# Patient Record
Sex: FEMALE | Race: WHITE | Employment: OTHER | URBAN - METROPOLITAN AREA
[De-identification: names, ages, dates, MRNs, and addresses within clinical notes are randomized per-mention and may not be internally consistent; named-entity substitution may affect disease eponyms.]

---

## 2023-07-12 ENCOUNTER — HOSPITAL ENCOUNTER (OUTPATIENT)
Dept: MRI IMAGING | Age: 83
Discharge: HOME | End: 2023-07-14
Attending: NEUROLOGICAL SURGERY
Payer: MEDICARE

## 2023-07-12 DIAGNOSIS — S32.010A COMPRESSION FRACTURE OF L1 VERTEBRA, INITIAL ENCOUNTER (HCC): ICD-10-CM

## 2023-07-12 PROCEDURE — 72148 MRI LUMBAR SPINE W/O DYE: CPT

## 2023-07-25 ENCOUNTER — HOSPITAL ENCOUNTER (OUTPATIENT)
Dept: CT IMAGING | Age: 83
Discharge: HOME | End: 2023-07-27
Attending: FAMILY MEDICINE
Payer: MEDICARE

## 2023-07-25 DIAGNOSIS — N20.0 KIDNEY STONE: ICD-10-CM

## 2023-07-25 PROCEDURE — 74176 CT ABD & PELVIS W/O CONTRAST: CPT

## 2023-08-10 ENCOUNTER — HOSPITAL ENCOUNTER (OUTPATIENT)
Age: 83
Setting detail: THERAPIES SERIES
Discharge: HOME | End: 2023-08-10
Payer: MEDICARE

## 2023-08-10 PROCEDURE — 97161 PT EVAL LOW COMPLEX 20 MIN: CPT

## 2023-08-10 PROCEDURE — 97110 THERAPEUTIC EXERCISES: CPT

## 2023-08-10 NOTE — CONSULTS
program  Method of Education: [x] Verbal  [x] Demo  [x] Written  Comprehension of Education:  [x] Verbalizes understanding. [] Demonstrates understanding. [] Needs Review. [] Demonstrates/verbalizes understanding of HEP/Ed previously given. Access Code: [email or web address]  URL: [email or web address]  Date: 08/10/2023  Prepared by: Sheryl Moreira    Exercises  - Supine Bridge  - 1 x daily - 7 x weekly - 3 sets - 10 reps  - Supine Posterior Pelvic Tilt  - 1 x daily - 7 x weekly - 3 sets - 10 reps  - Supine Lower Trunk Rotation  - 1 x daily - 7 x weekly - 3 sets - 10 reps  - Supine Straight Leg Raises  - 1 x daily - 7 x weekly - 3 sets - 10 reps  - Sidelying Hip Abduction  - 1 x daily - 7 x weekly - 3 sets - 10 reps  - Prone Hip Extension  - 1 x daily - 7 x weekly - 3 sets - 10 reps  - Lying Prone  - 1 x daily - 7 x weekly - 3 sets - 10 reps  - Prone on Elbows Stretch  - 1 x daily - 7 x weekly - 3 sets - 10 reps  - Supine Hip Adduction Isometric with Ball  - 1 x daily - 7 x weekly - 3 sets - 10 reps    Treatment Plan:  [x] Therapeutic Exercise   81438  [] Iontophoresis: 4 mg/mL Dexamethasone Sodium Phosphate  mAmin  82345   [x] Therapeutic Activity  62300 [] Vasopneumatic cold with compression  53373    [x] Gait Training   64473 [] Ultrasound   [id number]   [x] Neuromuscular Re-education  86365 [x] Electrical Stimulation Unattended  22558   [x] Manual Therapy  84120 [] Electrical Stimulation Attended  93710   [x] Instruction in HEP  [] Lumbar/Cervical Traction  94737   [] Aquatic Therapy   89225 [x] Cold/hotpack    [] Massage   07319      [] Dry Needling, 1 or 2 muscles  68741   [] Biofeedback, first 15 minutes   44410  [] Biofeedback, additional 15 minutes   59191 [] Dry Needling, 3 or more muscles  79086     []  Medication allergies reviewed for use of    Dexamethasone Sodium Phosphate 4mg/ml     with iontophoresis treatments. Pt is not allergic.       Frequency:  2 x/week for 24 visits    Anita Gallegos

## 2023-08-11 ENCOUNTER — HOSPITAL ENCOUNTER (OUTPATIENT)
Dept: CT IMAGING | Age: 83
End: 2023-08-11
Attending: FAMILY MEDICINE
Payer: MEDICARE

## 2023-08-11 DIAGNOSIS — N28.9 KIDNEY LESION, NATIVE, RIGHT: ICD-10-CM

## 2023-08-11 LAB — CREAT BLD-MCNC: 1.7 MG/DL (ref 0.6–1.4)

## 2023-08-11 PROCEDURE — 82565 ASSAY OF CREATININE: CPT

## 2023-08-11 PROCEDURE — 6360000004 HC RX CONTRAST MEDICATION: Performed by: FAMILY MEDICINE

## 2023-08-11 PROCEDURE — 2580000003 HC RX 258: Performed by: FAMILY MEDICINE

## 2023-08-11 RX ORDER — SODIUM CHLORIDE 0.9 % (FLUSH) 0.9 %
10 SYRINGE (ML) INJECTION PRN
Status: DISCONTINUED | OUTPATIENT
Start: 2023-08-11 | End: 2023-08-11

## 2023-08-11 RX ORDER — 0.9 % SODIUM CHLORIDE 0.9 %
80 INTRAVENOUS SOLUTION INTRAVENOUS ONCE
Status: DISCONTINUED | OUTPATIENT
Start: 2023-08-11 | End: 2023-08-11

## 2023-08-15 ENCOUNTER — HOSPITAL ENCOUNTER (OUTPATIENT)
Age: 83
Setting detail: THERAPIES SERIES
Discharge: HOME | End: 2023-08-15
Payer: MEDICARE

## 2023-08-15 PROCEDURE — 97110 THERAPEUTIC EXERCISES: CPT

## 2023-08-15 PROCEDURE — G0283 ELEC STIM OTHER THAN WOUND: HCPCS

## 2023-08-15 NOTE — FLOWSHEET NOTE
[] [address], [address] [place] [address]    Physical Therapy Daily Treatment Note      Date:  8/15/2023  Patient Name:  Baljit Flores    :  1940  MRN: [id number]  Physician: Baldev Colon MD                               Insurance: Prior auth required after evaluation needed for outpt physical therapy with [facility])\  received approval for 8 visits (9 total) from 8/10/23--10/8/23  Medical Diagnosis: R26.81   Gait Instability                          Rehab Codes: R26.81, M54.50, R26.89  Onset Date: 2021                                  Next 's appt: TBD  Visit# / total visits: 2 24  Cancels/No Shows: 0/0    Subjective:    Pain:  [x] Yes  [] No Location: low back Pain Rating: (0-10 scale) 2/10  Pain altered Tx:  [] No  [] Yes  Action:  Comments:  Pt denies new problems. She reports her back pain is minimal now.   Worst when first gets up or after standing for any time    Objective:  Modalities:   Precautions:  Exercises:  Exercise Reps/ Time Weight/ Level Comments   Nu Step  L3  5'                           SUPINE         bridge 10x       LTR 10x       Pelvic tilt 10x       MICHAEL ant hip stretch  30\" x 3       MICHAEL ham stretch  15\" x 4       MICHAEL piriformis stretch  15\" x 4       Hip add isometrics  2\"  x 10       Hooklying hip fall out         SLR                                       PRONE         Prone/LEONARDO 3' each       Prone hip ext 10 each       Sidely hip abd 10 x each                                                         STAND         Heel/toe raises         squats         Step ups         Stand on foam         Stand feet together         Biased stance         Eyes closed                             Side step each way                                                     TENS/moist heat to low back 15'  Prone         Other:Fall Prevention Intervention Form given to pt on 8/15/23    Specific Instructions for next

## 2023-08-18 ENCOUNTER — HOSPITAL ENCOUNTER (OUTPATIENT)
Age: 83
Setting detail: THERAPIES SERIES
Discharge: HOME | End: 2023-08-18
Payer: MEDICARE

## 2023-08-18 PROCEDURE — 97110 THERAPEUTIC EXERCISES: CPT

## 2023-08-18 PROCEDURE — G0283 ELEC STIM OTHER THAN WOUND: HCPCS

## 2023-08-18 NOTE — FLOWSHEET NOTE
[x] Demo  [x] Written  Comprehension of Education:  [x] Verbalizes understanding. [] Demonstrates understanding. [x] Needs review. [] Demonstrates/verbalizes HEP/Ed previously given. Access Code: [email or web address]  URL: [email or web address]  Date: 08/10/2023  Prepared by: Kellen Dixon     Exercises  - Supine Bridge  - 1 x daily - 7 x weekly - 3 sets - 10 reps  - Supine Posterior Pelvic Tilt  - 1 x daily - 7 x weekly - 3 sets - 10 reps  - Supine Lower Trunk Rotation  - 1 x daily - 7 x weekly - 3 sets - 10 reps  - Supine Straight Leg Raises  - 1 x daily - 7 x weekly - 3 sets - 10 reps  - Sidelying Hip Abduction  - 1 x daily - 7 x weekly - 3 sets - 10 reps  - Prone Hip Extension  - 1 x daily - 7 x weekly - 3 sets - 10 reps  - Lying Prone  - 1 x daily - 7 x weekly - 3 sets - 10 reps  - Prone on Elbows Stretch  - 1 x daily - 7 x weekly - 3 sets - 10 reps  - Supine Hip Adduction Isometric with Ball  - 1 x daily - 7 x weekly - 3 sets - 10 reps    - Supine Hamstring Stretch  - 1 x daily - 7 x weekly - 3 sets - 10 reps  - Supine Piriformis Stretch with Leg Straight  - 1 x daily - 7 x weekly - 3 sets - 10 reps       Plan: [x] Continue per plan of care.    [] Other:      Treatment Charges: Mins Units   [x]  Modalities E STIM with heat 15 1   [x]  Ther Exercise 40 3   []  Manual Therapy     []  Ther Activities     []  Aquatics     []  Neuromuscular     [] Vasocompression     [] Gait Training     [] Dry needling        [] 1 or 2 muscles        [] 3 or more muscles     []  Other     Total Treatment time 55 4     Time In:2:30 pm            Time Out: 3:40 pm    Electronically signed by:  Kellen Dixon PT

## 2023-08-21 ENCOUNTER — TRANSCRIBE ORDERS (OUTPATIENT)
Dept: ADMINISTRATIVE | Age: 83
End: 2023-08-21

## 2023-08-21 DIAGNOSIS — N28.9 LESION OF RIGHT NATIVE KIDNEY: Primary | ICD-10-CM

## 2023-08-22 ENCOUNTER — HOSPITAL ENCOUNTER (OUTPATIENT)
Age: 83
Setting detail: THERAPIES SERIES
Discharge: HOME | End: 2023-08-22
Payer: MEDICARE

## 2023-08-22 PROCEDURE — 97110 THERAPEUTIC EXERCISES: CPT

## 2023-08-22 PROCEDURE — G0283 ELEC STIM OTHER THAN WOUND: HCPCS

## 2023-08-22 NOTE — FLOWSHEET NOTE
[x] [facility]) - [facility] & Therapy  [address] [facility], [address] [place], [place] [address]     Physical Therapy Daily Treatment Note      Date:  2023  Patient Name:  Erin Gallardo    :  1940  MRN: [id number]  Physician: Mirella Vincent MD                               Insurance: Prior auth required after evaluation needed for outpt physical therapy with [facility])\  received approval for 8 visits (9 total) from 8/10/23--10/8/23  Medical Diagnosis: R26.81   Gait Instability                          Rehab Codes: R26.81, M54.50, R26.89  Onset Date: 2021                                  Next 's appt: TBD  Visit# / total visits:   Cancels/No Shows: 0/0    Subjective:    Pain:  [x] Yes  [] No Location: low back Pain Rating: (0-10 scale) 3/10  Pain altered Tx:  [x] No  [] Yes  Action:  Comments:  Pt denies new problems. 3/10 pain today. State she had a dentist appointment and was a little sore after laying in the chair for a while.      Objective:  Modalities:   Precautions:  Exercises:  Exercise Reps/ Time Weight/ Level Comments   Nu Step  L3  5'                           SUPINE         bridge 10x       LTR 10x       Pelvic tilt 10x       MICHAEL ant hip stretch  30\" x 3       MICHAEL ham stretch  15\" x 4       MICHAEL piriformis stretch  15\" x 4       Hip add isometrics  2\"  x 10       Hooklying hip fall out  10x       SLR  10x                                      PRONE         Prone/LEONARDO 3' each       Prone hip ext 10 each       Sidelying hip abd 10 x each        prone press ups 5x                                             STAND         Heel/toe raises  10x    At bed    squats         Step ups         Stand on foam         Stand feet together  1' x 2    At bed SBA   Biased stance         Eyes closed                             Side step each way                                                     TENS/moist heat to low back 15'  Prone         Other:Fall Prevention

## 2023-08-24 ENCOUNTER — HOSPITAL ENCOUNTER (OUTPATIENT)
Dept: CT IMAGING | Age: 83
Discharge: HOME | End: 2023-08-26
Attending: FAMILY MEDICINE
Payer: MEDICARE

## 2023-08-24 DIAGNOSIS — N28.9 LESION OF RIGHT NATIVE KIDNEY: ICD-10-CM

## 2023-08-24 LAB — CREAT BLD-MCNC: 1.6 MG/DL (ref 0.6–1.4)

## 2023-08-24 PROCEDURE — 74178 CT ABD&PLV WO CNTR FLWD CNTR: CPT

## 2023-08-24 PROCEDURE — 2580000003 HC RX 258: Performed by: FAMILY MEDICINE

## 2023-08-24 PROCEDURE — 6360000004 HC RX CONTRAST MEDICATION: Performed by: FAMILY MEDICINE

## 2023-08-24 PROCEDURE — 82565 ASSAY OF CREATININE: CPT

## 2023-08-24 RX ORDER — SODIUM CHLORIDE 0.9 % (FLUSH) 0.9 %
10 SYRINGE (ML) INJECTION PRN
Status: DISCONTINUED | OUTPATIENT
Start: 2023-08-24 | End: 2023-08-27 | Stop reason: HOSPADM

## 2023-08-24 RX ORDER — 0.9 % SODIUM CHLORIDE 0.9 %
80 INTRAVENOUS SOLUTION INTRAVENOUS ONCE
Status: COMPLETED | OUTPATIENT
Start: 2023-08-24 | End: 2023-08-24

## 2023-08-24 RX ADMIN — IOPAMIDOL 37 ML: 755 INJECTION, SOLUTION INTRAVENOUS at 14:50

## 2023-08-24 RX ADMIN — SODIUM CHLORIDE, PRESERVATIVE FREE 10 ML: 5 INJECTION INTRAVENOUS at 14:51

## 2023-08-24 RX ADMIN — SODIUM CHLORIDE 80 ML: 9 INJECTION, SOLUTION INTRAVENOUS at 14:51

## 2023-08-25 ENCOUNTER — HOSPITAL ENCOUNTER (OUTPATIENT)
Age: 83
Setting detail: THERAPIES SERIES
Discharge: HOME | End: 2023-08-25
Payer: MEDICARE

## 2023-08-25 PROCEDURE — G0283 ELEC STIM OTHER THAN WOUND: HCPCS

## 2023-08-25 PROCEDURE — 97110 THERAPEUTIC EXERCISES: CPT

## 2023-08-25 NOTE — FLOWSHEET NOTE
[x] [facility]) - [facility] & Therapy  [address] [facility], [address] [place] [address]    Physical Therapy Daily Treatment Note      Date:  2023  Patient Name:  Carolyne Dennison    :  1940  MRN: [id number]  Physician: Jim Noble MD                               Insurance: Prior auth required after evaluation needed for outpt physical therapy with [facility])\  received approval for 8 visits (9 total) from 8/10/23--10/8/23  Medical Diagnosis: R26.81   Gait Instability                          Rehab Codes: R26.81, M54.50, R26.89  Onset Date: 2021                                  Next 's appt: TBD  Visit# / total visits:   Cancels/No Shows: 0/0    Subjective:    Pain:  [x] Yes  [] No Location: low back Pain Rating: (0-10 scale) 3/10  Pain altered Tx:  [x] No  [] Yes  Action:  Comments:  Pt denies new problems. 4/10 pain today. States she did fall backward yesterday tripping over something at home. Pt denies any further medical attention needed. States she is a little more sore today.      Objective:  Modalities:   Precautions:  Exercises:  Exercise Reps/ Time Weight/ Level Comments   Nu Step  L3  5'    NP                        SUPINE         bridge 10x       LTR 10x       Pelvic tilt 10x       MICHAEL ant hip stretch  30\" x 3       MICHAEL ham stretch  15\" x 4       MICHAEL piriformis stretch  15\" x 4       Hip add isometrics  2\"  x 10       Hooklying hip fall out  10x       SLR  10x                                      PRONE         Prone/LEONARDO 3' each       Prone hip ext 10 each       Sidelying hip abd 10 x each        prone press ups 5x                                             STAND         Heel/toe raises  10x    At bed    squats         Step ups         Stand on foam         Stand feet together  1' x 2    At bed SBA   Biased stance         Eyes closed                             Side step each way

## 2023-08-29 ENCOUNTER — HOSPITAL ENCOUNTER (OUTPATIENT)
Age: 83
Setting detail: THERAPIES SERIES
Discharge: HOME | End: 2023-08-29
Payer: MEDICARE

## 2023-08-29 PROCEDURE — 97110 THERAPEUTIC EXERCISES: CPT

## 2023-08-29 PROCEDURE — G0283 ELEC STIM OTHER THAN WOUND: HCPCS

## 2023-08-29 NOTE — FLOWSHEET NOTE
[x] [facility]) - [facility] & Therapy  [address] [facility], [address] [place] [address]    Physical Therapy Daily Treatment Note      Date:  2023  Patient Name:  Kerri Gama    :  1940  MRN: [id number]  Physician: Ben Reese MD                               Insurance: Prior auth required after evaluation needed for outpt physical therapy with [facility])\  received approval for 8 visits (9 total) from 8/10/23--10/8/23  Medical Diagnosis: R26.81   Gait Instability                          Rehab Codes: R26.81, M54.50, R26.89  Onset Date: 2021                                  Next 's appt: TBD  Visit# / total visits:   Cancels/No Shows: 0/0    Subjective:    Pain:  [x] Yes  [] No Location: low back Pain Rating: (0-10 scale) 3/10  Pain altered Tx:  [x] No  [] Yes  Action:  Comments:  Pt denies new problems. 3/10 pain today. Denies new problems. Denies falls since last PT session. Pt reports she still has trouble walking on uneven surfaces but has been able to walk through grocery store. She reports minimal increase of pain with this but she gets very fatigued before she is done.     Objective:  Modalities:   Precautions:  Exercises:  Exercise Reps/ Time Weight/ Level Comments   Nu Step  L3  5'                           SUPINE         bridge 10x       LTR 10x       Pelvic tilt 10x       MICHAEL ant hip stretch  30\" x 3       MICHAEL ham stretch  15\" x 4       MICHAEL piriformis stretch  15\" x 4       Hip add isometrics  2\"  x 20       Hooklying hip fall out  10x       SLR  10x                                      PRONE         Prone/LEONARDO 3' each       Prone hip ext 10 each       Sidelying hip abd 10 x each        prone press ups 5x                                             STAND         Heel/toe raises  15x    At bed    squats  10x       Step ups         Stand on foam         Stand feet together  1' x 2    At bed SBA   Biased stance  2x each       Eyes closed

## 2023-08-30 PROBLEM — M54.16 LUMBAR RADICULOPATHY: Status: ACTIVE | Noted: 2023-08-30

## 2023-08-30 PROBLEM — G62.9 SMALL FIBER NEUROPATHY: Status: ACTIVE | Noted: 2018-11-29

## 2023-08-30 PROBLEM — E66.9 OBESITY (BMI 30-39.9): Status: ACTIVE | Noted: 2023-08-30

## 2023-08-30 PROBLEM — R30.0 DYSURIA: Status: ACTIVE | Noted: 2023-08-30

## 2023-08-30 PROBLEM — K14.1 GEOGRAPHIC TONGUE: Status: ACTIVE | Noted: 2023-02-15

## 2023-08-30 PROBLEM — T78.3XXA ANGIOEDEMA: Status: ACTIVE | Noted: 2023-08-30

## 2023-08-30 PROBLEM — R29.6 FALLS: Status: ACTIVE | Noted: 2023-08-30

## 2023-08-30 PROBLEM — G50.0 TRIGEMINAL NEURALGIA: Status: ACTIVE | Noted: 2023-08-30

## 2023-08-30 PROBLEM — E11.9 T2DM (TYPE 2 DIABETES MELLITUS) (HCC): Status: ACTIVE | Noted: 2023-02-15

## 2023-08-30 PROBLEM — M10.9 GOUT: Status: ACTIVE | Noted: 2023-02-15

## 2023-08-30 PROBLEM — R47.81 SLURRED SPEECH: Status: ACTIVE | Noted: 2023-08-30

## 2023-08-30 PROBLEM — K21.9 GERD (GASTROESOPHAGEAL REFLUX DISEASE): Status: ACTIVE | Noted: 2023-02-15

## 2023-08-30 PROBLEM — I10 HYPERTENSION: Status: ACTIVE | Noted: 2023-08-30

## 2023-08-30 PROBLEM — E03.9 HYPOTHYROIDISM: Status: ACTIVE | Noted: 2023-08-30

## 2023-08-30 PROBLEM — D47.2 MONOCLONAL GAMMOPATHY OF UNKNOWN SIGNIFICANCE (MGUS): Status: ACTIVE | Noted: 2023-02-15

## 2023-08-30 PROBLEM — M48.00 SPINAL STENOSIS: Status: ACTIVE | Noted: 2023-02-15

## 2023-08-30 PROBLEM — E78.5 HYPERLIPIDEMIA: Status: ACTIVE | Noted: 2023-02-15

## 2023-08-30 PROBLEM — G47.33 OBSTRUCTIVE SLEEP APNEA SYNDROME: Status: ACTIVE | Noted: 2023-02-15

## 2023-08-30 PROBLEM — F41.9 ANXIETY: Status: ACTIVE | Noted: 2023-08-30

## 2023-08-30 PROBLEM — M17.9 OSTEOARTHRITIS OF KNEE: Status: ACTIVE | Noted: 2023-08-30

## 2023-08-30 PROBLEM — G62.9 NEUROPATHY: Status: ACTIVE | Noted: 2023-08-30

## 2023-08-30 PROBLEM — G45.9 TRANSIENT ISCHEMIC ATTACK: Status: ACTIVE | Noted: 2023-08-30

## 2023-08-30 PROBLEM — F33.40 RECURRENT MAJOR DEPRESSION IN REMISSION (HCC): Status: ACTIVE | Noted: 2023-08-30
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
## 2023-08-30 RX ORDER — EPINEPHRINE 0.3 MG/.3ML
0.3 INJECTION SUBCUTANEOUS
COMMUNITY

## 2023-08-30 RX ORDER — LORAZEPAM 1 MG/1
TABLET ORAL
COMMUNITY
Start: 2023-06-08

## 2023-08-30 RX ORDER — AMITRIPTYLINE HYDROCHLORIDE 25 MG/1
25 TABLET, FILM COATED ORAL NIGHTLY
COMMUNITY
Start: 2019-07-12

## 2023-08-30 RX ORDER — AMLODIPINE BESYLATE 10 MG/1
10 TABLET ORAL
COMMUNITY

## 2023-08-30 RX ORDER — FAMOTIDINE 20 MG/1
20 TABLET, FILM COATED ORAL
COMMUNITY
Start: 2021-09-24

## 2023-08-30 RX ORDER — ASPIRIN 81 MG/1
81 TABLET ORAL DAILY
COMMUNITY
Start: 2022-05-19

## 2023-08-30 RX ORDER — BUPROPION HYDROCHLORIDE 300 MG/1
TABLET ORAL
COMMUNITY
Start: 2023-07-19

## 2023-08-30 RX ORDER — CITALOPRAM 40 MG/1
TABLET ORAL
COMMUNITY
Start: 2023-07-13

## 2023-08-30 RX ORDER — MELOXICAM 15 MG/1
TABLET ORAL
COMMUNITY
Start: 2023-07-20

## 2023-08-30 RX ORDER — FEXOFENADINE HCL 180 MG/1
180 TABLET ORAL
COMMUNITY

## 2023-08-30 RX ORDER — METOPROLOL TARTRATE 50 MG/1
50 TABLET, FILM COATED ORAL
COMMUNITY
Start: 2021-05-06

## 2023-08-30 RX ORDER — OXYBUTYNIN CHLORIDE 5 MG/1
5 TABLET ORAL 3 TIMES DAILY
COMMUNITY

## 2023-08-30 RX ORDER — RANITIDINE 150 MG/1
150 CAPSULE ORAL 2 TIMES DAILY
COMMUNITY

## 2023-08-30 RX ORDER — ALLOPURINOL 100 MG/1
100 TABLET ORAL 2 TIMES DAILY
COMMUNITY
Start: 2021-07-01

## 2023-08-30 RX ORDER — ISOSORBIDE MONONITRATE 30 MG/1
TABLET, EXTENDED RELEASE ORAL
COMMUNITY
Start: 2023-07-15

## 2023-08-30 RX ORDER — FENTANYL 25 UG/1
PATCH TRANSDERMAL
COMMUNITY
Start: 2021-06-29

## 2023-08-30 RX ORDER — LEVOTHYROXINE SODIUM 0.1 MG/1
100 TABLET ORAL DAILY
COMMUNITY
Start: 2020-11-19

## 2023-08-30 RX ORDER — IBUPROFEN 600 MG/1
600 TABLET ORAL
COMMUNITY
Start: 2021-09-24 | End: 2023-09-01

## 2023-08-30 RX ORDER — OXYCODONE HYDROCHLORIDE AND ACETAMINOPHEN 5; 325 MG/1; MG/1
TABLET ORAL
COMMUNITY

## 2023-08-30 RX ORDER — OMEPRAZOLE 20 MG/1
20 CAPSULE, DELAYED RELEASE ORAL
COMMUNITY
Start: 2020-11-23

## 2023-08-30 RX ORDER — NIFEDIPINE 30 MG/1
TABLET, FILM COATED, EXTENDED RELEASE ORAL
COMMUNITY
Start: 2023-06-12

## 2023-08-30 RX ORDER — CARVEDILOL 12.5 MG/1
TABLET ORAL
COMMUNITY
Start: 2023-07-05

## 2023-08-30 RX ORDER — LORATADINE 10 MG/1
10 TABLET ORAL 2 TIMES DAILY
COMMUNITY
End: 2023-09-01

## 2023-09-01 ENCOUNTER — HOSPITAL ENCOUNTER (OUTPATIENT)
Age: 83
Setting detail: THERAPIES SERIES
Discharge: HOME | End: 2023-09-01
Payer: MEDICARE

## 2023-09-01 ENCOUNTER — OFFICE VISIT (OUTPATIENT)
Age: 83
End: 2023-09-01
Payer: MEDICARE

## 2023-09-01 VITALS — DIASTOLIC BLOOD PRESSURE: 80 MMHG | BODY MASS INDEX: 35.61 KG/M2 | SYSTOLIC BLOOD PRESSURE: 128 MMHG | WEIGHT: 201 LBS

## 2023-09-01 DIAGNOSIS — J20.9 BRONCHOSPASM WITH BRONCHITIS, ACUTE: Primary | ICD-10-CM

## 2023-09-01 DIAGNOSIS — Z91.81 AT HIGH RISK FOR FALLS: ICD-10-CM

## 2023-09-01 PROCEDURE — 97110 THERAPEUTIC EXERCISES: CPT

## 2023-09-01 PROCEDURE — 3078F DIAST BP <80 MM HG: CPT | Performed by: FAMILY MEDICINE

## 2023-09-01 PROCEDURE — 3075F SYST BP GE 130 - 139MM HG: CPT | Performed by: FAMILY MEDICINE

## 2023-09-01 PROCEDURE — 99213 OFFICE O/P EST LOW 20 MIN: CPT | Performed by: FAMILY MEDICINE

## 2023-09-01 PROCEDURE — 96372 THER/PROPH/DIAG INJ SC/IM: CPT | Performed by: FAMILY MEDICINE

## 2023-09-01 PROCEDURE — 1123F ACP DISCUSS/DSCN MKR DOCD: CPT | Performed by: FAMILY MEDICINE

## 2023-09-01 PROCEDURE — G0283 ELEC STIM OTHER THAN WOUND: HCPCS

## 2023-09-01 RX ORDER — METHYLPREDNISOLONE ACETATE 80 MG/ML
80 INJECTION, SUSPENSION INTRA-ARTICULAR; INTRALESIONAL; INTRAMUSCULAR; SOFT TISSUE ONCE
Status: COMPLETED | OUTPATIENT
Start: 2023-09-01 | End: 2023-09-01

## 2023-09-01 RX ORDER — LANOLIN ALCOHOL/MO/W.PET/CERES
1000 CREAM (GRAM) TOPICAL DAILY
COMMUNITY

## 2023-09-01 RX ADMIN — METHYLPREDNISOLONE ACETATE 80 MG: 80 INJECTION, SUSPENSION INTRA-ARTICULAR; INTRALESIONAL; INTRAMUSCULAR; SOFT TISSUE at 15:19

## 2023-09-01 SDOH — ECONOMIC STABILITY: FOOD INSECURITY: WITHIN THE PAST 12 MONTHS, YOU WORRIED THAT YOUR FOOD WOULD RUN OUT BEFORE YOU GOT MONEY TO BUY MORE.: NEVER TRUE

## 2023-09-01 SDOH — ECONOMIC STABILITY: FOOD INSECURITY: WITHIN THE PAST 12 MONTHS, THE FOOD YOU BOUGHT JUST DIDN'T LAST AND YOU DIDN'T HAVE MONEY TO GET MORE.: NEVER TRUE

## 2023-09-01 SDOH — ECONOMIC STABILITY: HOUSING INSECURITY
IN THE LAST 12 MONTHS, WAS THERE A TIME WHEN YOU DID NOT HAVE A STEADY PLACE TO SLEEP OR SLEPT IN A SHELTER (INCLUDING NOW)?: NO

## 2023-09-01 SDOH — ECONOMIC STABILITY: INCOME INSECURITY: HOW HARD IS IT FOR YOU TO PAY FOR THE VERY BASICS LIKE FOOD, HOUSING, MEDICAL CARE, AND HEATING?: NOT HARD AT ALL

## 2023-09-01 NOTE — FLOWSHEET NOTE
[x] [facility]) - [facility] & Therapy  [address] [facility], [address] [place] [address]    Physical Therapy Daily Treatment Note      Date:  2023  Patient Name:  Maira Naranjo    :  1940  MRN: [id number]  Physician: Tigre Wilson MD                               Insurance: Prior auth required after evaluation needed for outpt physical therapy with [facility])\  received approval for 8 visits (9 total) from 8/10/23--10/8/23  Medical Diagnosis: R26.81   Gait Instability                          Rehab Codes: R26.81, M54.50, R26.89  Onset Date: 2021                                  Next 's appt: TBD  Visit# / total visits:   Cancels/No Shows: 0/0    Subjective:    Pain:  [x] Yes  [] No Location: low back Pain Rating: (0-10 scale) 3/10  Pain altered Tx:  [x] No  [] Yes  Action:  Comments:  Pt denies new problems. Pt reports she took a shower earlier today and when she was finished she had increased pain in her back,  up to 5/10 today.   She also notes she gets increased pain with prolonged standing in the kitchen  Objective:  Modalities:   Precautions:  Exercises:  Exercise Reps/ Time Weight/ Level Comments   Nu Step  L3  5'                           SUPINE         bridge 10x       LTR 10x       Pelvic tilt 10x       MICHAEL ant hip stretch  30\" x 3       MICHAEL ham stretch  15\" x 4       MICHAEL piriformis stretch  15\" x 4       Hip add isometrics  2\"  x 20       Hooklying hip fall out  15x       SLR  10x         Hooklying hip ER  red x 15                           PRONE         Prone/LEONARDO 3' each       Prone hip ext 10 each       Sidelying hip abd 10 x each        prone press ups 5x                                             STAND         Heel/toe raises  15x    At bed    squats  10x       Step ups         Stand on foam         Stand feet together  1' x 2    At bed SBA   Biased stance  2x each       Eyes closed  2x         MICHAEL 3 way hip  10 each                 Side

## 2023-09-01 NOTE — PROGRESS NOTES
[facility]      Date of Visit:  2023  Patient Name: Jn Mosley   Patient :  1940     CHIEF COMPLAINT/HPI:     Jn Mosley is a 82 y.o. female who presents today for an general visit to be evaluated for the following condition(s):  Chief Complaint   Patient presents with    Results     Recent CT test   Patient having problems with recurring barking cough. Her son in law is having problems going through chemo. Her bladder spasms have reduced. Her urine stream is also hesitency. Patient has had 3 falls since last visit. Patient is going to PT for her back. Her DTR    REVIEW OF SYSTEM      Review of Systems   Respiratory:  Negative for chest tightness and shortness of breath. Cardiovascular:  Negative for chest pain.        REVIEWED INFORMATION      Allergies   Allergen Reactions    Cephalexin Other (See Comments)     Depression      Flavoxate Hcl     Macadamia Nut Oil      Other reaction(s): skin test  Other reaction(s): skin test      Sulfa Antibiotics     Sulphamethoxydiazine Hives    Tizanidine     Penicillins Swelling and Rash       Current Outpatient Medications   Medication Sig Dispense Refill    vitamin B-12 (CYANOCOBALAMIN) 1000 MCG tablet Take 1 tablet by mouth daily      vitamin D 25 MCG (1000 UT) CAPS Take 2 capsules by mouth      aspirin 81 MG EC tablet Take 1 tablet by mouth daily      buPROPion (WELLBUTRIN XL) 300 MG extended release tablet 1 tablet in the morning Orally Once a day for 30 days      carvedilol (COREG) 12.5 MG tablet       citalopram (CELEXA) 40 MG tablet       EPINEPHrine (EPIPEN) 0.3 MG/0.3ML SOAJ injection Inject 0.3 mLs into the muscle once as needed      famotidine (PEPCID) 20 MG tablet Take 1 tablet by mouth      fexofenadine (ALLEGRA) 180 MG tablet Take 1 tablet by mouth      isosorbide mononitrate (IMDUR) 30 MG extended release tablet       levothyroxine (SYNTHROID) 100 MCG tablet Take 1 tablet by mouth daily      LORazepam (ATIVAN) 1 MG tablet

## 2023-09-04 ENCOUNTER — APPOINTMENT (OUTPATIENT)
Dept: GENERAL RADIOLOGY | Age: 83
End: 2023-09-04
Payer: MEDICARE

## 2023-09-04 ENCOUNTER — HOSPITAL ENCOUNTER (EMERGENCY)
Age: 83
Discharge: HOME | End: 2023-09-04
Attending: EMERGENCY MEDICINE
Payer: MEDICARE

## 2023-09-04 VITALS
BODY MASS INDEX: 35.61 KG/M2 | TEMPERATURE: 99.7 F | RESPIRATION RATE: 16 BRPM | OXYGEN SATURATION: 95 % | SYSTOLIC BLOOD PRESSURE: 137 MMHG | DIASTOLIC BLOOD PRESSURE: 77 MMHG | HEIGHT: 63 IN | WEIGHT: 201 LBS | HEART RATE: 81 BPM

## 2023-09-04 DIAGNOSIS — S62.101A CLOSED FRACTURE OF RIGHT WRIST, INITIAL ENCOUNTER: Primary | ICD-10-CM

## 2023-09-04 PROCEDURE — 25605 CLTX DST RDL FX/EPHYS SEP W/: CPT

## 2023-09-04 PROCEDURE — 2500000003 HC RX 250 WO HCPCS

## 2023-09-04 PROCEDURE — 6370000000 HC RX 637 (ALT 250 FOR IP): Performed by: EMERGENCY MEDICINE

## 2023-09-04 PROCEDURE — 73110 X-RAY EXAM OF WRIST: CPT

## 2023-09-04 PROCEDURE — 99283 EMERGENCY DEPT VISIT LOW MDM: CPT

## 2023-09-04 RX ORDER — LIDOCAINE HYDROCHLORIDE 10 MG/ML
10 INJECTION, SOLUTION EPIDURAL; INFILTRATION; INTRACAUDAL; PERINEURAL ONCE
Status: COMPLETED | OUTPATIENT
Start: 2023-09-04 | End: 2023-09-04

## 2023-09-04 RX ORDER — LIDOCAINE HYDROCHLORIDE 10 MG/ML
20 INJECTION, SOLUTION INFILTRATION; PERINEURAL ONCE
Status: DISCONTINUED | OUTPATIENT
Start: 2023-09-04 | End: 2023-09-04

## 2023-09-04 RX ORDER — LIDOCAINE HYDROCHLORIDE 10 MG/ML
INJECTION, SOLUTION EPIDURAL; INFILTRATION; INTRACAUDAL; PERINEURAL
Status: COMPLETED
Start: 2023-09-04 | End: 2023-09-04

## 2023-09-04 RX ORDER — OXYCODONE HYDROCHLORIDE AND ACETAMINOPHEN 5; 325 MG/1; MG/1
1 TABLET ORAL ONCE
Status: COMPLETED | OUTPATIENT
Start: 2023-09-04 | End: 2023-09-04

## 2023-09-04 RX ORDER — HYDROCODONE BITARTRATE AND ACETAMINOPHEN 5; 325 MG/1; MG/1
1 TABLET ORAL ONCE
Status: DISCONTINUED | OUTPATIENT
Start: 2023-09-04 | End: 2023-09-04

## 2023-09-04 RX ADMIN — LIDOCAINE HYDROCHLORIDE 10 ML: 10 INJECTION, SOLUTION EPIDURAL; INFILTRATION; INTRACAUDAL; PERINEURAL at 10:34

## 2023-09-04 RX ADMIN — OXYCODONE HYDROCHLORIDE AND ACETAMINOPHEN 1 TABLET: 5; 325 TABLET ORAL at 09:40

## 2023-09-04 ASSESSMENT — PAIN SCALES - GENERAL
PAINLEVEL_OUTOF10: 2
PAINLEVEL_OUTOF10: 2

## 2023-09-04 ASSESSMENT — ENCOUNTER SYMPTOMS
CHEST TIGHTNESS: 0
SHORTNESS OF BREATH: 0

## 2023-09-04 ASSESSMENT — PAIN - FUNCTIONAL ASSESSMENT: PAIN_FUNCTIONAL_ASSESSMENT: 0-10

## 2023-09-04 NOTE — DISCHARGE INSTRUCTIONS
Continue medications as prescribed  Maintain splint until follow-up  With orthopedist call for an appointment on Tuesday  Return immediately if any worsening symptoms or any other concerns    Tell us how we did visit: [email or web address]   and let us know about your experience

## 2023-09-05 ENCOUNTER — OFFICE VISIT (OUTPATIENT)
Age: 83
End: 2023-09-05
Payer: MEDICARE

## 2023-09-05 ENCOUNTER — HOSPITAL ENCOUNTER (OUTPATIENT)
Age: 83
Discharge: HOME | End: 2023-09-05
Payer: MEDICARE

## 2023-09-05 ENCOUNTER — TELEPHONE (OUTPATIENT)
Age: 83
End: 2023-09-05

## 2023-09-05 VITALS — WEIGHT: 208 LBS | HEIGHT: 62 IN | BODY MASS INDEX: 38.28 KG/M2

## 2023-09-05 DIAGNOSIS — S52.571A OTHER CLOSED INTRA-ARTICULAR FRACTURE OF DISTAL END OF RIGHT RADIUS, INITIAL ENCOUNTER: Primary | ICD-10-CM

## 2023-09-05 DIAGNOSIS — Z01.818 PRE-OP EXAM: ICD-10-CM

## 2023-09-05 DIAGNOSIS — Z01.818 PRE-OP EXAM: Primary | ICD-10-CM

## 2023-09-05 LAB
BASOPHILS # BLD: 0.04 K/UL (ref 0–0.2)
BASOPHILS NFR BLD: 0 % (ref 0–2)
EOSINOPHIL # BLD: 0.37 K/UL (ref 0–0.44)
EOSINOPHILS RELATIVE PERCENT: 4 % (ref 1–4)
ERYTHROCYTE [DISTWIDTH] IN BLOOD BY AUTOMATED COUNT: 15.5 % (ref 11.8–14.4)
HCT VFR BLD AUTO: 37.2 % (ref 36.3–47.1)
HGB BLD-MCNC: 11.4 G/DL (ref 11.9–15.1)
IMM GRANULOCYTES # BLD AUTO: 0.05 K/UL (ref 0–0.3)
IMM GRANULOCYTES NFR BLD: 1 %
LYMPHOCYTES NFR BLD: 1.57 K/UL (ref 1.1–3.7)
LYMPHOCYTES RELATIVE PERCENT: 17 % (ref 24–43)
MCH RBC QN AUTO: 29.2 PG (ref 25.2–33.5)
MCHC RBC AUTO-ENTMCNC: 30.6 G/DL (ref 28.4–34.8)
MCV RBC AUTO: 95.4 FL (ref 82.6–102.9)
MONOCYTES NFR BLD: 0.85 K/UL (ref 0.1–1.2)
MONOCYTES NFR BLD: 9 % (ref 3–12)
NEUTROPHILS NFR BLD: 69 % (ref 36–65)
NEUTS SEG NFR BLD: 6.6 K/UL (ref 1.5–8.1)
NRBC BLD-RTO: 0 PER 100 WBC
PLATELET # BLD AUTO: 217 K/UL (ref 138–453)
PMV BLD AUTO: 10.6 FL (ref 8.1–13.5)
RBC # BLD AUTO: 3.9 M/UL (ref 3.95–5.11)
RBC # BLD: ABNORMAL 10*6/UL
WBC OTHER # BLD: 9.5 K/UL (ref 3.5–11.3)

## 2023-09-05 PROCEDURE — 93005 ELECTROCARDIOGRAM TRACING: CPT | Performed by: ORTHOPAEDIC SURGERY

## 2023-09-05 PROCEDURE — 1123F ACP DISCUSS/DSCN MKR DOCD: CPT | Performed by: ORTHOPAEDIC SURGERY

## 2023-09-05 PROCEDURE — 99214 OFFICE O/P EST MOD 30 MIN: CPT | Performed by: ORTHOPAEDIC SURGERY

## 2023-09-05 PROCEDURE — 36415 COLL VENOUS BLD VENIPUNCTURE: CPT

## 2023-09-05 PROCEDURE — 85025 COMPLETE CBC W/AUTO DIFF WBC: CPT

## 2023-09-05 NOTE — PROGRESS NOTES
[place] [address] AND SPORTS MEDICINE  [address] [place] [address] [facility] [address] [place] [address] [place] [address]  Dept: [phone]  Dept Fax: [phone]    Chief Compliant:  No chief complaint on file. History of Present Illness: This is a 82 y.o. female who presents to the clinic today for evaluation / follow up of right wrist fracture. She fell yesterday at home and was taken emergency room and was splinted. She denies any numbness or tingling however she does have significant neuropathy in her feet. She does take 4 Percocet a day for chronic back pain and neuropathy pain. Jennifer De Oliveira Physical Exam:    On examination the right wrist she has intact EPL she is in a splint we did not remove this today. Cap of less than 2 seconds of the digits and sensation intact light touch. Nursing note and vitals reviewed. Labs and Imaging: X-rays of the right wrist with injury films and postreduction films show that she has an intra-articular split in the distal radius with dorsal tilt and loss of inclination. She has a ulnar styloid fracture. XR taken today:  XR WRIST RIGHT (MIN 3 VIEWS)    Result Date: 9/4/2023  EXAMINATION: 3 XRAY VIEWS OF THE RIGHT WRIST 9/4/2023 10:42 am COMPARISON: September 4, 2023 HISTORY: ORDERING SYSTEM PROVIDED HISTORY: reduction? TECHNOLOGIST PROVIDED HISTORY: reduction? Reason for Exam: post reduction FINDINGS: Split artifact overlies the imaged. Transverse impacted fracture of the distal radius. Improved alignment when compared to the prior exam. Persistent mild dorsal angulation of the distal fracture. Ulnar styloid process fracture is not well seen. Near anatomic alignment of the distal radial fracture post reduction. XR WRIST RIGHT (MIN 3 VIEWS)    Result Date: 9/4/2023  EXAMINATION: 3 XRAY VIEWS OF THE RIGHT WRIST 9/4/2023 9:30 am COMPARISON: None.  HISTORY: ORDERING SYSTEM PROVIDED

## 2023-09-06 ENCOUNTER — TELEPHONE (OUTPATIENT)
Age: 83
End: 2023-09-06

## 2023-09-06 ENCOUNTER — ANESTHESIA EVENT (OUTPATIENT)
Dept: OPERATING ROOM | Age: 83
End: 2023-09-06
Payer: MEDICARE

## 2023-09-06 ENCOUNTER — APPOINTMENT (OUTPATIENT)
Age: 83
End: 2023-09-06
Payer: MEDICARE

## 2023-09-06 DIAGNOSIS — M48.061 SPINAL STENOSIS, LUMBAR REGION, WITHOUT NEUROGENIC CLAUDICATION: Primary | ICD-10-CM

## 2023-09-06 LAB
EKG ATRIAL RATE: 83 BPM
EKG P AXIS: 50 DEGREES
EKG P-R INTERVAL: 128 MS
EKG Q-T INTERVAL: 402 MS
EKG QRS DURATION: 86 MS
EKG QTC CALCULATION (BAZETT): 472 MS
EKG R AXIS: 3 DEGREES
EKG T AXIS: 24 DEGREES
EKG VENTRICULAR RATE: 83 BPM

## 2023-09-06 RX ORDER — OXYCODONE HYDROCHLORIDE AND ACETAMINOPHEN 5; 325 MG/1; MG/1
1 TABLET ORAL EVERY 4 HOURS PRN
Qty: 150 TABLET | Refills: 0 | Status: CANCELLED | OUTPATIENT
Start: 2023-09-06 | End: 2023-10-06

## 2023-09-06 RX ORDER — OXYCODONE AND ACETAMINOPHEN 7.5; 325 MG/1; MG/1
1 TABLET ORAL EVERY 4 HOURS PRN
Qty: 150 TABLET | Refills: 0 | Status: SHIPPED | OUTPATIENT
Start: 2023-09-06 | End: 2023-10-06

## 2023-09-06 NOTE — TELEPHONE ENCOUNTER
Mallory broke wrist 9/4/23 sx with dr Deuce Lopez tomorrow.     Refill percocet -pt unsure of mgs but she knows she can take up to 5 daily

## 2023-09-07 ENCOUNTER — HOSPITAL ENCOUNTER (OUTPATIENT)
Age: 83
Setting detail: OUTPATIENT SURGERY
Discharge: HOME | End: 2023-09-07
Attending: ORTHOPAEDIC SURGERY | Admitting: ORTHOPAEDIC SURGERY
Payer: MEDICARE

## 2023-09-07 ENCOUNTER — APPOINTMENT (OUTPATIENT)
Dept: GENERAL RADIOLOGY | Age: 83
End: 2023-09-07
Attending: ORTHOPAEDIC SURGERY
Payer: MEDICARE

## 2023-09-07 ENCOUNTER — ANESTHESIA (OUTPATIENT)
Dept: OPERATING ROOM | Age: 83
End: 2023-09-07
Payer: MEDICARE

## 2023-09-07 VITALS
SYSTOLIC BLOOD PRESSURE: 120 MMHG | HEART RATE: 81 BPM | TEMPERATURE: 97.7 F | HEIGHT: 63 IN | RESPIRATION RATE: 12 BRPM | OXYGEN SATURATION: 92 % | WEIGHT: 208 LBS | DIASTOLIC BLOOD PRESSURE: 62 MMHG | BODY MASS INDEX: 36.86 KG/M2

## 2023-09-07 PROCEDURE — 3600000004 HC SURGERY LEVEL 4 BASE: Performed by: ORTHOPAEDIC SURGERY

## 2023-09-07 PROCEDURE — 3700000001 HC ADD 15 MINUTES (ANESTHESIA): Performed by: ORTHOPAEDIC SURGERY

## 2023-09-07 PROCEDURE — 7100000001 HC PACU RECOVERY - ADDTL 15 MIN: Performed by: ORTHOPAEDIC SURGERY

## 2023-09-07 PROCEDURE — 7100000000 HC PACU RECOVERY - FIRST 15 MIN: Performed by: ORTHOPAEDIC SURGERY

## 2023-09-07 PROCEDURE — 6360000002 HC RX W HCPCS

## 2023-09-07 PROCEDURE — 3700000000 HC ANESTHESIA ATTENDED CARE: Performed by: ORTHOPAEDIC SURGERY

## 2023-09-07 PROCEDURE — 64417 NJX AA&/STRD AX NERVE IMG: CPT | Performed by: ANESTHESIOLOGY

## 2023-09-07 PROCEDURE — 2500000003 HC RX 250 WO HCPCS

## 2023-09-07 PROCEDURE — 3600000014 HC SURGERY LEVEL 4 ADDTL 15MIN: Performed by: ORTHOPAEDIC SURGERY

## 2023-09-07 PROCEDURE — 2720000010 HC SURG SUPPLY STERILE: Performed by: ORTHOPAEDIC SURGERY

## 2023-09-07 PROCEDURE — 6360000002 HC RX W HCPCS: Performed by: ANESTHESIOLOGY

## 2023-09-07 PROCEDURE — 25607 OPTX DST RD XARTC FX/EPI SEP: CPT | Performed by: ORTHOPAEDIC SURGERY

## 2023-09-07 PROCEDURE — C1713 ANCHOR/SCREW BN/BN,TIS/BN: HCPCS | Performed by: ORTHOPAEDIC SURGERY

## 2023-09-07 PROCEDURE — 2709999900 HC NON-CHARGEABLE SUPPLY: Performed by: ORTHOPAEDIC SURGERY

## 2023-09-07 PROCEDURE — 7100000011 HC PHASE II RECOVERY - ADDTL 15 MIN: Performed by: ORTHOPAEDIC SURGERY

## 2023-09-07 PROCEDURE — 2580000003 HC RX 258: Performed by: ORTHOPAEDIC SURGERY

## 2023-09-07 PROCEDURE — 7100000010 HC PHASE II RECOVERY - FIRST 15 MIN: Performed by: ORTHOPAEDIC SURGERY

## 2023-09-07 DEVICE — SPINAL INSTRUMENT RONGEUR (STRAIGHT) 8MM: Type: IMPLANTABLE DEVICE | Status: FUNCTIONAL

## 2023-09-07 DEVICE — SCREW BNE L20MM DIA2.4MM DST RAD VOLAR S STL ST VAR ANG LOK: Type: IMPLANTABLE DEVICE | Status: FUNCTIONAL

## 2023-09-07 DEVICE — SCREW BNE L22MM DIA2.4MM DST RAD VOLAR S STL ST VAR ANG LOK: Type: IMPLANTABLE DEVICE | Status: FUNCTIONAL

## 2023-09-07 DEVICE — SCREW BNE L18MM DIA2.4MM DST RAD VOLAR S STL ST VAR ANG LOK: Type: IMPLANTABLE DEVICE | Status: FUNCTIONAL

## 2023-09-07 DEVICE — PLATE BNE W22XL54MM STD 9 H R DST RAD VOLAR S STL VAR ANG: Type: IMPLANTABLE DEVICE | Status: FUNCTIONAL

## 2023-09-07 DEVICE — SCREW BNE L14MM DIA2.7MM CORT S STL ST T8 STARDRV RECESS: Type: IMPLANTABLE DEVICE | Status: FUNCTIONAL

## 2023-09-07 RX ORDER — DIPHENHYDRAMINE HYDROCHLORIDE 50 MG/ML
12.5 INJECTION INTRAMUSCULAR; INTRAVENOUS
Status: DISCONTINUED | OUTPATIENT
Start: 2023-09-07 | End: 2023-09-07 | Stop reason: HOSPADM

## 2023-09-07 RX ORDER — MORPHINE SULFATE 2 MG/ML
1 INJECTION, SOLUTION INTRAMUSCULAR; INTRAVENOUS EVERY 5 MIN PRN
Status: DISCONTINUED | OUTPATIENT
Start: 2023-09-07 | End: 2023-09-07 | Stop reason: HOSPADM

## 2023-09-07 RX ORDER — MIDAZOLAM HYDROCHLORIDE 2 MG/2ML
2 INJECTION, SOLUTION INTRAMUSCULAR; INTRAVENOUS
Status: DISCONTINUED | OUTPATIENT
Start: 2023-09-07 | End: 2023-09-07 | Stop reason: HOSPADM

## 2023-09-07 RX ORDER — LIDOCAINE HYDROCHLORIDE 10 MG/ML
INJECTION, SOLUTION INFILTRATION; PERINEURAL PRN
Status: DISCONTINUED | OUTPATIENT
Start: 2023-09-07 | End: 2023-09-07 | Stop reason: SDUPTHER

## 2023-09-07 RX ORDER — MIDAZOLAM HYDROCHLORIDE 1 MG/ML
INJECTION INTRAMUSCULAR; INTRAVENOUS
Status: COMPLETED
Start: 2023-09-07 | End: 2023-09-07

## 2023-09-07 RX ORDER — SODIUM CHLORIDE 0.9 % (FLUSH) 0.9 %
5-40 SYRINGE (ML) INJECTION EVERY 12 HOURS SCHEDULED
Status: DISCONTINUED | OUTPATIENT
Start: 2023-09-07 | End: 2023-09-07 | Stop reason: HOSPADM

## 2023-09-07 RX ORDER — DEXAMETHASONE SODIUM PHOSPHATE 10 MG/ML
INJECTION INTRAMUSCULAR; INTRAVENOUS PRN
Status: DISCONTINUED | OUTPATIENT
Start: 2023-09-07 | End: 2023-09-07 | Stop reason: SDUPTHER

## 2023-09-07 RX ORDER — FENTANYL CITRATE 50 UG/ML
INJECTION, SOLUTION INTRAMUSCULAR; INTRAVENOUS
Status: COMPLETED
Start: 2023-09-07 | End: 2023-09-07

## 2023-09-07 RX ORDER — ONDANSETRON 2 MG/ML
INJECTION INTRAMUSCULAR; INTRAVENOUS PRN
Status: DISCONTINUED | OUTPATIENT
Start: 2023-09-07 | End: 2023-09-07 | Stop reason: SDUPTHER

## 2023-09-07 RX ORDER — HYDRALAZINE HYDROCHLORIDE 20 MG/ML
10 INJECTION INTRAMUSCULAR; INTRAVENOUS
Status: DISCONTINUED | OUTPATIENT
Start: 2023-09-07 | End: 2023-09-07 | Stop reason: HOSPADM

## 2023-09-07 RX ORDER — OXYCODONE HYDROCHLORIDE 5 MG/1
5 TABLET ORAL PRN
Status: DISCONTINUED | OUTPATIENT
Start: 2023-09-07 | End: 2023-09-07 | Stop reason: HOSPADM

## 2023-09-07 RX ORDER — SODIUM CHLORIDE 0.9 % (FLUSH) 0.9 %
5-40 SYRINGE (ML) INJECTION PRN
Status: DISCONTINUED | OUTPATIENT
Start: 2023-09-07 | End: 2023-09-07 | Stop reason: HOSPADM

## 2023-09-07 RX ORDER — OXYCODONE HYDROCHLORIDE 5 MG/1
10 TABLET ORAL PRN
Status: DISCONTINUED | OUTPATIENT
Start: 2023-09-07 | End: 2023-09-07 | Stop reason: HOSPADM

## 2023-09-07 RX ORDER — MIDAZOLAM HYDROCHLORIDE 2 MG/2ML
2 INJECTION, SOLUTION INTRAMUSCULAR; INTRAVENOUS ONCE
Status: COMPLETED | OUTPATIENT
Start: 2023-09-07 | End: 2023-09-07

## 2023-09-07 RX ORDER — DEXAMETHASONE SODIUM PHOSPHATE 10 MG/ML
INJECTION, SOLUTION INTRAMUSCULAR; INTRAVENOUS
Status: COMPLETED | OUTPATIENT
Start: 2023-09-07 | End: 2023-09-07

## 2023-09-07 RX ORDER — BUPIVACAINE HYDROCHLORIDE 2.5 MG/ML
INJECTION, SOLUTION EPIDURAL; INFILTRATION; INTRACAUDAL
Status: COMPLETED | OUTPATIENT
Start: 2023-09-07 | End: 2023-09-07

## 2023-09-07 RX ORDER — PHENYLEPHRINE HCL IN 0.9% NACL 1 MG/10 ML
SYRINGE (ML) INTRAVENOUS PRN
Status: DISCONTINUED | OUTPATIENT
Start: 2023-09-07 | End: 2023-09-07 | Stop reason: SDUPTHER

## 2023-09-07 RX ORDER — METOCLOPRAMIDE HYDROCHLORIDE 5 MG/ML
10 INJECTION INTRAMUSCULAR; INTRAVENOUS
Status: DISCONTINUED | OUTPATIENT
Start: 2023-09-07 | End: 2023-09-07 | Stop reason: HOSPADM

## 2023-09-07 RX ORDER — PROPOFOL 10 MG/ML
INJECTION, EMULSION INTRAVENOUS PRN
Status: DISCONTINUED | OUTPATIENT
Start: 2023-09-07 | End: 2023-09-07 | Stop reason: SDUPTHER

## 2023-09-07 RX ORDER — FENTANYL CITRATE 50 UG/ML
50 INJECTION, SOLUTION INTRAMUSCULAR; INTRAVENOUS ONCE
Status: COMPLETED | OUTPATIENT
Start: 2023-09-07 | End: 2023-09-07

## 2023-09-07 RX ORDER — FENTANYL CITRATE 50 UG/ML
INJECTION, SOLUTION INTRAMUSCULAR; INTRAVENOUS PRN
Status: DISCONTINUED | OUTPATIENT
Start: 2023-09-07 | End: 2023-09-07 | Stop reason: SDUPTHER

## 2023-09-07 RX ORDER — CEFAZOLIN 2 G/1
INJECTION, POWDER, FOR SOLUTION INTRAMUSCULAR; INTRAVENOUS
Status: COMPLETED
Start: 2023-09-07 | End: 2023-09-07

## 2023-09-07 RX ORDER — CEFAZOLIN SODIUM 1 G/3ML
INJECTION, POWDER, FOR SOLUTION INTRAMUSCULAR; INTRAVENOUS PRN
Status: DISCONTINUED | OUTPATIENT
Start: 2023-09-07 | End: 2023-09-07 | Stop reason: SDUPTHER

## 2023-09-07 RX ORDER — ONDANSETRON 2 MG/ML
4 INJECTION INTRAMUSCULAR; INTRAVENOUS
Status: DISCONTINUED | OUTPATIENT
Start: 2023-09-07 | End: 2023-09-07 | Stop reason: HOSPADM

## 2023-09-07 RX ORDER — SODIUM CHLORIDE 9 MG/ML
INJECTION, SOLUTION INTRAVENOUS PRN
Status: DISCONTINUED | OUTPATIENT
Start: 2023-09-07 | End: 2023-09-07 | Stop reason: HOSPADM

## 2023-09-07 RX ORDER — LABETALOL HYDROCHLORIDE 5 MG/ML
10 INJECTION, SOLUTION INTRAVENOUS
Status: DISCONTINUED | OUTPATIENT
Start: 2023-09-07 | End: 2023-09-07 | Stop reason: HOSPADM

## 2023-09-07 RX ORDER — DEXAMETHASONE SODIUM PHOSPHATE 10 MG/ML
INJECTION, SOLUTION INTRAMUSCULAR; INTRAVENOUS
Status: COMPLETED
Start: 2023-09-07 | End: 2023-09-07

## 2023-09-07 RX ORDER — FENTANYL CITRATE 50 UG/ML
100 INJECTION, SOLUTION INTRAMUSCULAR; INTRAVENOUS ONCE
Status: COMPLETED | OUTPATIENT
Start: 2023-09-07 | End: 2023-09-07

## 2023-09-07 RX ADMIN — BUPIVACAINE HYDROCHLORIDE 30 ML: 2.5 INJECTION, SOLUTION EPIDURAL; INFILTRATION; INTRACAUDAL; PERINEURAL at 12:50

## 2023-09-07 RX ADMIN — FENTANYL CITRATE 50 MCG: 50 INJECTION, SOLUTION INTRAMUSCULAR; INTRAVENOUS at 12:39

## 2023-09-07 RX ADMIN — FENTANYL CITRATE 25 MCG: 50 INJECTION, SOLUTION INTRAMUSCULAR; INTRAVENOUS at 13:34

## 2023-09-07 RX ADMIN — MIDAZOLAM HYDROCHLORIDE 2 MG: 2 INJECTION, SOLUTION INTRAMUSCULAR; INTRAVENOUS at 12:50

## 2023-09-07 RX ADMIN — LIDOCAINE HYDROCHLORIDE 50 MG: 10 INJECTION, SOLUTION INFILTRATION; PERINEURAL at 13:34

## 2023-09-07 RX ADMIN — MIDAZOLAM HYDROCHLORIDE 2 MG: 2 INJECTION, SOLUTION INTRAMUSCULAR; INTRAVENOUS at 12:38

## 2023-09-07 RX ADMIN — FENTANYL CITRATE 25 MCG: 50 INJECTION, SOLUTION INTRAMUSCULAR; INTRAVENOUS at 13:40

## 2023-09-07 RX ADMIN — FENTANYL CITRATE 50 MCG: 50 INJECTION, SOLUTION INTRAMUSCULAR; INTRAVENOUS at 12:37

## 2023-09-07 RX ADMIN — DEXAMETHASONE SODIUM PHOSPHATE 4 MG: 10 INJECTION INTRAMUSCULAR; INTRAVENOUS at 13:45

## 2023-09-07 RX ADMIN — FENTANYL CITRATE 25 MCG: 50 INJECTION, SOLUTION INTRAMUSCULAR; INTRAVENOUS at 13:50

## 2023-09-07 RX ADMIN — MIDAZOLAM HYDROCHLORIDE 2 MG: 1 INJECTION, SOLUTION INTRAMUSCULAR; INTRAVENOUS at 12:38

## 2023-09-07 RX ADMIN — MIDAZOLAM HYDROCHLORIDE 2 MG: 1 INJECTION, SOLUTION INTRAMUSCULAR; INTRAVENOUS at 12:50

## 2023-09-07 RX ADMIN — PROPOFOL 30 MG: 10 INJECTION, EMULSION INTRAVENOUS at 13:43

## 2023-09-07 RX ADMIN — SODIUM CHLORIDE: 9 INJECTION, SOLUTION INTRAVENOUS at 13:30

## 2023-09-07 RX ADMIN — ONDANSETRON 4 MG: 2 INJECTION INTRAMUSCULAR; INTRAVENOUS at 14:12

## 2023-09-07 RX ADMIN — PROPOFOL 70 MG: 10 INJECTION, EMULSION INTRAVENOUS at 13:34

## 2023-09-07 RX ADMIN — PROPOFOL 75 MCG/KG/MIN: 10 INJECTION, EMULSION INTRAVENOUS at 13:35

## 2023-09-07 RX ADMIN — FENTANYL CITRATE 100 MCG: 50 INJECTION, SOLUTION INTRAMUSCULAR; INTRAVENOUS at 12:51

## 2023-09-07 RX ADMIN — Medication 100 MCG: at 13:57

## 2023-09-07 RX ADMIN — DEXAMETHASONE SODIUM PHOSPHATE 8 MG: 10 INJECTION, SOLUTION INTRAMUSCULAR; INTRAVENOUS at 12:50

## 2023-09-07 RX ADMIN — CEFAZOLIN 2 G: 2 INJECTION, POWDER, FOR SOLUTION INTRAMUSCULAR; INTRAVENOUS at 13:40

## 2023-09-07 ASSESSMENT — PAIN DESCRIPTION - DESCRIPTORS: DESCRIPTORS: ACHING

## 2023-09-07 ASSESSMENT — PAIN - FUNCTIONAL ASSESSMENT: PAIN_FUNCTIONAL_ASSESSMENT: 0-10

## 2023-09-07 NOTE — OP NOTE
Operative Note      Patient: Erin Gallardo  YOB: 1940  MRN: [id number]    Date of Procedure: 9/7/2023    Pre-Op Diagnosis Codes:     * Closed fracture of distal end of right radius, unspecified fracture morphology, initial encounter [S52.501A]    Post-Op Diagnosis: Three-part intra-articular fracture of right distal radius       Procedure(s):  RIGHT DISTAL RADIUS OPEN REDUCTION INTERNAL FIXATION WITH SYNTHES AND PRE-OP REGIONAL BLOCK    Surgeon(s):  Le Wallace MD    Assistant:   First Assistant: Akilah Gill; Dario Blackman RN    Anesthesia: Monitor Anesthesia Care    Estimated Blood Loss (mL): Minimal    Complications: None    Specimens:   * No specimens in log *    Implants:  Implant Name Type Inv. Item Serial No.  Lot No. LRB No. Used Action   PLATE BNE [id number] STD 9 H R DST RAD VOLAR S STL ARCELIA ANG - [id number]  PLATE BNE [id number] STD 9 H R DST RAD VOLAR S STL ARCELIA ANG  DEPUY SYNTH[employer]  Right 1 Implanted   SCREW BNE L18MM DIA2.4MM DST RAD VOLAR S STL ST ARCELIA ANG KAVIN - [id number]  SCREW BNE L18MM DIA2.4MM DST RAD VOLAR S STL ST ARCELIA ANG KAVIN  DEPUY SYNTHES [employer]-WD  Right 1 Implanted   SCREW BNE L20MM DIA2.4MM DST RAD VOLAR S STL ST ARCELIA ANG KAVIN - [id number]  SCREW BNE L20MM DIA2.4MM DST RAD VOLAR S STL ST ARCELIA ANG KAVIN  DEPUY SYNTHES [employer]-WD  Right 1 Implanted   SCREW BNE L22MM DIA2.4MM DST RAD VOLAR S STL ST ARCELIA ANG KAVIN - [id number]  SCREW BNE L22MM DIA2.4MM DST RAD VOLAR S STL ST ARCELIA ANG KAVIN  DEPUY SY[employer]THES [employer]-WD  Right 2 Implanted   SCREW BNE L10MM DIA2.7MM JACKSON S STL ST T8 STARDRV RECESS - [id number]  SCREW BNE L10MM DIA2.7MM JACKSON S STL ST T8 STARDRV RECESS  DEPUY SYNTHES [place]-  Right 1 Implanted   SCREW BNE L14MM DIA2.7MM JACKSON S STL ST T8 STARDRV RECESS - [id number]  SCREW BNE L14MM DIA2.7MM JACKSON S STL ST T8 STARDRV RECESS  DEPUY SYNTHES [place]-WD  Right 2 Implanted         Drains: * No LDAs found *    Findings: fracture fracture        Detailed Description of Procedure:    This is a 82-year-old female who has a right distal radius fracture. We have discussed with her the risks and benefits of this procedure and she has elected ahead with this today. Patient was brought to the operating room and received moderate anesthesia. Her right upper extremities and prepped and draped sterile fashion. Timeout was performed ensuring correct patient correct extremity and correct procedure. Preoperative antibiotics were assured with 2 g of Ancef. We elevated the arm for examination inflated tourniquet on the upper arm to 250 mm mercury. I made incision over the FCR tendon over the volar aspect of the wrist.  I incised sharply through skin and came down through subcu tissue with my tenotomy is gaining hemostasis with my Bovie. I then came down through the fascia through the FCR tendon and retracted the FCR tendon ulnarly. I then came down with my Bovie over the radial aspect of pronator quadratus and retracted this. I then continuous subperiosteal dissection to free up the fracture line. I then placed some traction to the wrist with ulnar deviation and flexion and placed a Synthes 3 hole plate on. I confirmed its position with the mini C arm. I then placed a cortical screw in proximally. I then placed in my locking screws and distally. I checked my position of hardware I had to redirect 1 screw. I then checked my position again and all the screws and hardware in good and good position we had excellent reduction. I then placed 2 more screws and proximally in the \"bicortically. They had excellent bony purchase. I then irrigated out. We took our final x-rays. We then closed with a 3 oh subcu Vicryl and staples for skin and placed her in a volar resting splint. Tourniquet was released. Her arm was placed in a sling. She was transferred to recovery in stable condition. Follow-up in the office 10 to 14 days.   Electronically signed by Karli Obregon MD on 9/7/2023 at 2:32 PM

## 2023-09-07 NOTE — ANESTHESIA PROCEDURE NOTES
Peripheral Block    Patient location during procedure: pre-op  Reason for block: post-op pain management and at surgeon's request  Start time: 9/7/2023 12:50 PM  End time: 9/7/2023 12:58 PM  Staffing  Performed: anesthesiologist   Anesthesiologist: Carline Garcia MD  Preanesthetic Checklist  Completed: patient identified, IV checked, site marked, risks and benefits discussed, surgical/procedural consents, equipment checked, pre-op evaluation, timeout performed, anesthesia consent given, oxygen available, monitors applied/VS acknowledged, fire risk safety assessment completed and verbalized and blood product R/B/A discussed and consented  Peripheral Block   Patient position: supine  Prep: ChloraPrep  Provider prep: mask and sterile gloves  Patient monitoring: cardiac monitor, continuous pulse ox, frequent blood pressure checks, oxygen, responsive to questions and IV access  Block type: Axillary  R axillary  Laterality: right  Injection technique: single-shot  Guidance: ultrasound guided  Local infiltration: bupivacaine  Infiltration strength: 0.25 %  Local infiltration: bupivacaine  Dose: 2 mL    Needle   Needle type: insulated echogenic nerve stimulator needle   Needle gauge: 20 G  Needle localization: anatomical landmarks and ultrasound guidance  Needle insertion depth: 4 cm  Test dose: negative  Needle length: 10 cm  Assessment   Injection assessment: negative aspiration for heme, no paresthesia on injection, local visualized surrounding nerve on ultrasound and no intravascular symptoms  Paresthesia pain: none  Slow fractionated injection: yes  Hemodynamics: stable  Outcomes: uncomplicated and patient tolerated procedure well    Additional Notes  Right  Axillary nerve block with 0.25% Bupivacaine + Dexamethasone 8 mg X 25 ml, musculocutaneous nerve block X 5 ml  Medications Administered  bupivacaine (MARCAINE) PF injection 0.25% - Perineural   30 mL - 9/7/2023 12:50:00 PM  dexamethasone (DECADRON) (PF) 10 mg/mL

## 2023-09-07 NOTE — ANESTHESIA PRE PROCEDURE
Department of Anesthesiology  Preprocedure Note       Name:  Kerri Gama   Age:  82 y.o.  :  1940                                          MRN:  [id number]         Date:  2023      Surgeon: Minerva Ko):  Pascual Butler MD    Procedure: Procedure(s):  RIGHT DISTAL RADIUS OPEN REDUCTION INTERNAL FIXATION WITH SYNTHES AND PRE-OP REGIONAL BLOCK    Medications prior to admission:   Prior to Admission medications    Medication Sig Start Date End Date Taking? Authorizing Provider   oxyCODONE-acetaminophen (PERCOCET) 7.5-325 MG per tablet Take 1 tablet by mouth every 4 hours as needed for Pain for up to 30 days. Intended supply: 30 days Max Daily Amount: 5 tablets 9/6/23 10/6/23  Ben Reese MD   vitamin B-12 (CYANOCOBALAMIN) 1000 MCG tablet Take 1 tablet by mouth daily    Historical Provider, MD   vitamin D 25 MCG (1000 UT) CAPS Take 2 capsules by mouth 21   Historical Provider, MD   EPINEPHrine 0.1 MG/0.1ML SOAJ epi pen for angiodema.  21   Historical Provider, MD   allopurinol (ZYLOPRIM) 100 MG tablet Take 1 tablet by mouth 2 times daily 21   Historical Provider, MD   amitriptyline (ELAVIL) 25 MG tablet Take 1 tablet by mouth nightly 19   Historical Provider, MD   amLODIPine (NORVASC) 10 MG tablet Take 1 tablet by mouth  Patient not taking: Reported on 2023    Historical Provider, MD   aspirin 81 MG EC tablet Take 1 tablet by mouth daily 22   Historical Provider, MD   buPROPion (WELLBUTRIN XL) 300 MG extended release tablet 1 tablet in the morning Orally Once a day for 30 days 23   Historical Provider, MD   carvedilol (COREG) 12.5 MG tablet  23   Historical Provider, MD   Cetirizine HCl 10 MG CAPS Take 10 mg by mouth 21   Historical Provider, MD   citalopram (CELEXA) 40 MG tablet  23   Historical Provider, MD   EPINEPHrine (EPIPEN) 0.3 MG/0.3ML SOAJ injection Inject 0.3 mLs into the muscle once as needed    Historical Provider, MD   famotidine (PEPCID) 20 MG

## 2023-09-07 NOTE — DISCHARGE INSTRUCTIONS
Activity-keep splint clean dry and intact at all times. Okay for digit range of motion. No weightbearing right arm. Sling to be worn at all times until block wears off, and then wear sling when up. Ice to incision for 20minutes out of every hour. Elevate arm as much as possible. Report fever, increasing or pus drainage from incision, or pain not controlled.

## 2023-09-07 NOTE — ANESTHESIA POSTPROCEDURE EVALUATION
Department of Anesthesiology  Postprocedure Note    Patient: Lio Clarke  MRN: [id number]  YOB: 1940  Date of evaluation: 9/7/2023      Procedure Summary     Date: 09/07/23 Room / Location: [address]) [facility]    Anesthesia Start: 1330 Anesthesia Stop: 1440    Procedure: RIGHT DISTAL RADIUS OPEN REDUCTION INTERNAL FIXATION WITH SYNTHES AND PRE-OP REGIONAL BLOCK (Right) Diagnosis:       Closed fracture of distal end of right radius, unspecified fracture morphology, initial encounter      (Closed fracture of distal end of right radius, unspecified fracture morphology, initial encounter [S52.501A])    Surgeons: Sara Ayala MD Responsible Provider: Ankit Michele MD    Anesthesia Type: regional, MAC, general ASA Status: 3          Anesthesia Type: No value filed.     Bren Phase I: Bren Score: 10    Bren Phase II:        Anesthesia Post Evaluation    Patient location during evaluation: PACU  Patient participation: complete - patient participated  Level of consciousness: awake and alert  Pain score: 0  Airway patency: patent  Nausea & Vomiting: no nausea and no vomiting  Complications: no  Cardiovascular status: blood pressure returned to baseline  Respiratory status: acceptable and room air  Hydration status: euvolemic  Pain management: adequate and satisfactory to patient

## 2023-09-08 ENCOUNTER — APPOINTMENT (OUTPATIENT)
Age: 83
End: 2023-09-08
Payer: MEDICARE

## 2023-09-12 ENCOUNTER — HOSPITAL ENCOUNTER (OUTPATIENT)
Age: 83
Setting detail: THERAPIES SERIES
Discharge: HOME | End: 2023-09-12
Payer: MEDICARE

## 2023-09-12 NOTE — FLOWSHEET NOTE
[] [facility] ([facility]) - [facility] &  Therapy  [address] [facility] [place] [facility].    [phone]  F: [phone]   [] [address]  [address]  P: [phone]  F: [phone]  [] [address]  [address]  P: [phone]  F: [phone] [] [facility]  P: [phone]  F: [phone]  [] [address] [facility]   [address]   [place]: [phone]  F: [phone]   [] [address] [facility]  [address]  [place]: [phone]   F: [phone]     Physical Therapy Cancel/No Show note    Date: 2023  Patient: Ji Collins  : 1940  MRN: [id number]    Cancels/No Shows to date:     For today's appointment patient:    [x]  Cancelled    [] Rescheduled appointment    [] No-show     Reason given by patient:    []  Patient ill    []  Conflicting appointment    [] No transportation      [] Conflict with work    [] No reason given    [] Weather related    [] COVID-19    [x] Other:      Comments:  fell and broke her wrist      [] Next appointment was confirmed    Electronically signed by: Madelyn Snyder, PT

## 2023-09-15 ENCOUNTER — APPOINTMENT (OUTPATIENT)
Age: 83
End: 2023-09-15
Payer: MEDICARE

## 2023-09-20 ENCOUNTER — TELEPHONE (OUTPATIENT)
Age: 83
End: 2023-09-20

## 2023-09-20 ENCOUNTER — OFFICE VISIT (OUTPATIENT)
Age: 83
End: 2023-09-20

## 2023-09-20 VITALS — HEIGHT: 63 IN | WEIGHT: 208 LBS | BODY MASS INDEX: 36.86 KG/M2

## 2023-09-20 DIAGNOSIS — S52.571A OTHER CLOSED INTRA-ARTICULAR FRACTURE OF DISTAL END OF RIGHT RADIUS, INITIAL ENCOUNTER: Primary | ICD-10-CM

## 2023-09-20 PROCEDURE — 99024 POSTOP FOLLOW-UP VISIT: CPT | Performed by: ORTHOPAEDIC SURGERY

## 2023-09-20 NOTE — PROGRESS NOTES
[facility]  [facility] SPECIALTY [facility], [facility] AND SPORTS MEDICINE  [address] [place] [id number]     Surgery:    9/7/2023  Right Distal Radius Open Reduction Internal Fixation With Synthes And Pre-op Regional Block - Right    History of Present Illness: This is a 82 y.o. female who presents to the clinic today for post op follow up for Right Distal Radius Open Reduction Internal Fixation With Synthes And Pre-op Regional Block - Right on 9/7/2023 . She is doing well postoperatively. She is come out of splint today. Her incisions clean dry intact healing nicely. Swelling is still present but diminishing. She has intact EPL sensation tact light touch she is moving all fingers appropriately. XR WRIST RIGHT (2 VIEWS)    Result Date: 9/20/2023  2 views of the right wrist show status post ORIF with hardware in good position she is maintained reduction       We placed her in a Velcro wrist splint today. Overall she is doing well I will see her back in 4 weeks with repeat x-rays of the right wrist.    We are going to order Ricarda Duong a walker with wheels with a platform as she has bilateral knee replacements with previous lumbar spine surgery as well and she has neuropathy affecting her balance significantly lower extremities. She currently has a wrist fracture as well. This was due to a fall. She needs his walker to perform her activities of daily living and prevent any further falls. She needs help ambulating safely in this should help stabilize her gait.           Electronically signed by Larissa Johnson MD on 9/20/2023 at 10:45 AM

## 2023-09-21 ENCOUNTER — TELEPHONE (OUTPATIENT)
Age: 83
End: 2023-09-21

## 2023-09-21 DIAGNOSIS — Z96.653 HISTORY OF BILATERAL KNEE REPLACEMENT: Primary | ICD-10-CM

## 2023-09-21 DIAGNOSIS — Z98.890 HISTORY OF LUMBOSACRAL SPINE SURGERY: ICD-10-CM

## 2023-09-21 DIAGNOSIS — G62.9 NEUROPATHY: ICD-10-CM

## 2023-09-21 DIAGNOSIS — Z96.653 PRESENCE OF BOTH ARTIFICIAL KNEE JOINTS: Primary | ICD-10-CM

## 2023-09-21 NOTE — TELEPHONE ENCOUNTER
Patient in need of walker due to history of falls. This patient has mobility issues that significantly impairs her ability to participate in one fore MRADL's in the home. Patient is able to safely use walker and functional deficit can be sufficiently resolved by the use of the walker with seat and brakes.

## 2023-09-27 ENCOUNTER — TELEPHONE (OUTPATIENT)
Age: 83
End: 2023-09-27

## 2023-09-27 DIAGNOSIS — Z98.890 HISTORY OF LUMBOSACRAL SPINE SURGERY: ICD-10-CM

## 2023-09-27 DIAGNOSIS — G62.9 NEUROPATHY: ICD-10-CM

## 2023-09-27 DIAGNOSIS — Z96.653 PRESENCE OF BOTH ARTIFICIAL KNEE JOINTS: Primary | ICD-10-CM

## 2023-09-30 NOTE — TELEPHONE ENCOUNTER
Loida Sharma is calling to request a refill on the following medication(s):    Medication Request:  Requested Prescriptions     Pending Prescriptions Disp Refills    oxybutynin (DITROPAN) 5 MG tablet [Pharmacy Med Name: oxyBUTYnin 5 MG TABLET] 60 tablet      Sig: TAKE 1 TABLET BY MOUTH TWICE A DAY FOR BLADDER SPASM       Last Visit Date (If Applicable):  5/6/6374    Next Visit Date:    11/6/2023

## 2023-10-01 RX ORDER — OXYBUTYNIN CHLORIDE 5 MG/1
TABLET ORAL
Qty: 60 TABLET | Refills: 5 | Status: SHIPPED | OUTPATIENT
Start: 2023-10-01

## 2023-10-13 DIAGNOSIS — G62.9 NEUROPATHY: Primary | ICD-10-CM

## 2023-10-13 RX ORDER — OXYBUTYNIN CHLORIDE 5 MG/1
TABLET ORAL
Qty: 60 TABLET | Refills: 5 | Status: CANCELLED | OUTPATIENT
Start: 2023-10-13

## 2023-10-13 RX ORDER — OXYCODONE AND ACETAMINOPHEN 7.5; 325 MG/1; MG/1
1 TABLET ORAL EVERY 4 HOURS PRN
Qty: 180 TABLET | Refills: 0 | Status: SHIPPED | OUTPATIENT
Start: 2023-10-13 | End: 2023-11-12

## 2023-10-13 RX ORDER — BUPROPION HYDROCHLORIDE 300 MG/1
300 TABLET ORAL EVERY MORNING
Qty: 30 TABLET | Refills: 5 | Status: SHIPPED | OUTPATIENT
Start: 2023-10-13 | End: 2023-10-13 | Stop reason: SDUPTHER

## 2023-10-13 RX ORDER — BUPROPION HYDROCHLORIDE 300 MG/1
300 TABLET ORAL EVERY MORNING
Qty: 30 TABLET | Refills: 5 | Status: SHIPPED | OUTPATIENT
Start: 2023-10-13

## 2023-10-13 RX ORDER — OXYCODONE AND ACETAMINOPHEN 7.5; 325 MG/1; MG/1
1 TABLET ORAL EVERY 4 HOURS PRN
COMMUNITY
End: 2023-10-13 | Stop reason: SDUPTHER

## 2023-10-13 NOTE — TELEPHONE ENCOUNTER
Uriel Jeter is calling to request a refill on the following medication(s):    Medication Request:  Requested Prescriptions     Pending Prescriptions Disp Refills    buPROPion (WELLBUTRIN XL) 300 MG extended release tablet [Pharmacy Med Name: buPROPion HCL  MG TABLET] 30 tablet      Sig: TAKE ONE TABLET BY MOUTH EVERY MORNING       Last Visit Date (If Applicable):  4/9/9117    Next Visit Date:    11/6/2023

## 2023-10-13 NOTE — TELEPHONE ENCOUNTER
Pt needs refill oxycodone 7.5 -325  tablet every 4 hours as needed   Bupropion HCL xl 300 mg I in am  Wily Colon

## 2023-10-15 DIAGNOSIS — F41.9 ANXIETY: Primary | ICD-10-CM

## 2023-10-16 RX ORDER — LORAZEPAM 1 MG/1
TABLET ORAL
Qty: 90 TABLET | Refills: 1 | Status: SHIPPED | OUTPATIENT
Start: 2023-10-16 | End: 2023-12-15

## 2023-10-18 ENCOUNTER — OFFICE VISIT (OUTPATIENT)
Age: 83
End: 2023-10-18

## 2023-10-18 VITALS — WEIGHT: 207.89 LBS | HEIGHT: 63 IN | BODY MASS INDEX: 36.84 KG/M2

## 2023-10-18 DIAGNOSIS — S52.571A OTHER CLOSED INTRA-ARTICULAR FRACTURE OF DISTAL END OF RIGHT RADIUS, INITIAL ENCOUNTER: Primary | ICD-10-CM

## 2023-10-18 PROCEDURE — 99024 POSTOP FOLLOW-UP VISIT: CPT | Performed by: ORTHOPAEDIC SURGERY

## 2023-10-18 NOTE — PROGRESS NOTES
[facility]  [facility] SPECIALTY [facility], [facility] AND SPORTS MEDICINE  [address] [address]     Surgery:    9/7/2023  Right Distal Radius Open Reduction Internal Fixation With Synthes And Pre-op Regional Block - Right    History of Present Illness: This is a 82 y.o. female who presents to the clinic today for post op follow up for Right Distal Radius Open Reduction Internal Fixation With Synthes And Pre-op Regional Block - Right on 9/7/2023 . She is doing well postoperatively. On examination her incision is well-healed she has probably 35 to 30 degrees of flexion and extension each direction she can make a full clenched fist EPL is intact. Very little swelling about the wrist.    XR WRIST RIGHT (2 VIEWS)    Result Date: 10/18/2023  2 views of the right wrist show status post open reduction internal fixation we have maintained good reduction position we have interval healing all hardware is stable. Overall she is doing very well she can wean herself out of the wrist brace that may explain her the expected course recovery over the next 3 to 6 months. This point she is doing very well we can see her back as needed.       Electronically signed by Karli Obregon MD on 10/18/2023 at 12:16 PM

## 2023-11-02 NOTE — TELEPHONE ENCOUNTER
Praneeth Trevino is calling to request a refill on the following medication(s):    Medication Request:  Requested Prescriptions     Pending Prescriptions Disp Refills    citalopram (CELEXA) 40 MG tablet [Pharmacy Med Name: CITALOPRAM TAB 40MG] 90 tablet 3     Sig: TAKE 1 TABLET ONCE DAILY       Last Visit Date (If Applicable):  4/0/4171    Next Visit Date:    11/6/2023

## 2023-11-03 RX ORDER — CITALOPRAM 40 MG/1
40 TABLET ORAL DAILY
Qty: 90 TABLET | Refills: 3 | Status: SHIPPED | OUTPATIENT
Start: 2023-11-03

## 2023-11-06 ENCOUNTER — OFFICE VISIT (OUTPATIENT)
Age: 83
End: 2023-11-06
Payer: MEDICARE

## 2023-11-06 VITALS
SYSTOLIC BLOOD PRESSURE: 130 MMHG | HEIGHT: 62 IN | BODY MASS INDEX: 35.61 KG/M2 | WEIGHT: 193.5 LBS | DIASTOLIC BLOOD PRESSURE: 80 MMHG | TEMPERATURE: 98.6 F | HEART RATE: 74 BPM | RESPIRATION RATE: 14 BRPM

## 2023-11-06 DIAGNOSIS — G62.9 SMALL FIBER NEUROPATHY: ICD-10-CM

## 2023-11-06 DIAGNOSIS — R63.4 WEIGHT LOSS: ICD-10-CM

## 2023-11-06 DIAGNOSIS — I10 PRIMARY HYPERTENSION: ICD-10-CM

## 2023-11-06 DIAGNOSIS — R29.898 LEG WEAKNESS, BILATERAL: ICD-10-CM

## 2023-11-06 DIAGNOSIS — Z23 ENCOUNTER FOR IMMUNIZATION: Primary | ICD-10-CM

## 2023-11-06 DIAGNOSIS — R26.81 UNSTEADY GAIT WHEN WALKING: ICD-10-CM

## 2023-11-06 DIAGNOSIS — E11.69 TYPE 2 DIABETES MELLITUS WITH OTHER SPECIFIED COMPLICATION, WITHOUT LONG-TERM CURRENT USE OF INSULIN (HCC): ICD-10-CM

## 2023-11-06 PROCEDURE — 3079F DIAST BP 80-89 MM HG: CPT | Performed by: FAMILY MEDICINE

## 2023-11-06 PROCEDURE — 99214 OFFICE O/P EST MOD 30 MIN: CPT | Performed by: FAMILY MEDICINE

## 2023-11-06 PROCEDURE — G0008 ADMIN INFLUENZA VIRUS VAC: HCPCS | Performed by: FAMILY MEDICINE

## 2023-11-06 PROCEDURE — 3075F SYST BP GE 130 - 139MM HG: CPT | Performed by: FAMILY MEDICINE

## 2023-11-06 PROCEDURE — 1123F ACP DISCUSS/DSCN MKR DOCD: CPT | Performed by: FAMILY MEDICINE

## 2023-11-06 PROCEDURE — 90694 VACC AIIV4 NO PRSRV 0.5ML IM: CPT | Performed by: FAMILY MEDICINE

## 2023-11-06 ASSESSMENT — PATIENT HEALTH QUESTIONNAIRE - PHQ9
SUM OF ALL RESPONSES TO PHQ QUESTIONS 1-9: 0
SUM OF ALL RESPONSES TO PHQ QUESTIONS 1-9: 0
SUM OF ALL RESPONSES TO PHQ9 QUESTIONS 1 & 2: 0
5. POOR APPETITE OR OVEREATING: 0
1. LITTLE INTEREST OR PLEASURE IN DOING THINGS: 0
8. MOVING OR SPEAKING SO SLOWLY THAT OTHER PEOPLE COULD HAVE NOTICED. OR THE OPPOSITE, BEING SO FIGETY OR RESTLESS THAT YOU HAVE BEEN MOVING AROUND A LOT MORE THAN USUAL: 0
6. FEELING BAD ABOUT YOURSELF - OR THAT YOU ARE A FAILURE OR HAVE LET YOURSELF OR YOUR FAMILY DOWN: 0
4. FEELING TIRED OR HAVING LITTLE ENERGY: 0
10. IF YOU CHECKED OFF ANY PROBLEMS, HOW DIFFICULT HAVE THESE PROBLEMS MADE IT FOR YOU TO DO YOUR WORK, TAKE CARE OF THINGS AT HOME, OR GET ALONG WITH OTHER PEOPLE: 0
7. TROUBLE CONCENTRATING ON THINGS, SUCH AS READING THE NEWSPAPER OR WATCHING TELEVISION: 0
2. FEELING DOWN, DEPRESSED OR HOPELESS: 0
9. THOUGHTS THAT YOU WOULD BE BETTER OFF DEAD, OR OF HURTING YOURSELF: 0
SUM OF ALL RESPONSES TO PHQ QUESTIONS 1-9: 0
SUM OF ALL RESPONSES TO PHQ QUESTIONS 1-9: 0
3. TROUBLE FALLING OR STAYING ASLEEP: 0

## 2023-11-06 ASSESSMENT — COLUMBIA-SUICIDE SEVERITY RATING SCALE - C-SSRS
7. DID THIS OCCUR IN THE LAST THREE MONTHS: NO
4. HAVE YOU HAD THESE THOUGHTS AND HAD SOME INTENTION OF ACTING ON THEM?: NO
3. HAVE YOU BEEN THINKING ABOUT HOW YOU MIGHT KILL YOURSELF?: NO
5. HAVE YOU STARTED TO WORK OUT OR WORKED OUT THE DETAILS OF HOW TO KILL YOURSELF? DO YOU INTEND TO CARRY OUT THIS PLAN?: NO

## 2023-11-06 ASSESSMENT — ENCOUNTER SYMPTOMS
SHORTNESS OF BREATH: 0
CHEST TIGHTNESS: 0

## 2023-11-06 NOTE — PROGRESS NOTES
After obtaining consent, and per orders of Dr. Gerald Moreira, injection of High  dose flu  given in Left deltoid by Jesus Conner MA. Patient tolerated the injection well.
referral made to PT for balance and gait training  2. Weight loss- check labs RTO in 6 weeks  3. DMII- check A1C CONT metformin    No orders of the defined types were placed in this encounter. Immunizations Administered       Name Date Dose Route    Influenza, FLUAD, (age 72 y+), Adjuvanted, 0.5mL 11/6/2023 0.5 mL Intramuscular    Site: Deltoid- Left    Lot: [id number]    NDC: 34744-942-63             Return in about 6 weeks (around 12/18/2023).       COMMUNICATION:     Disposition and Communication:     Electronically signed by Mel Villa MD on 11/6/2023 at 10:44 AM

## 2023-11-10 ENCOUNTER — TELEPHONE (OUTPATIENT)
Age: 83
End: 2023-11-10

## 2023-11-10 DIAGNOSIS — R68.84 PAIN IN MANDIBLE: Primary | ICD-10-CM

## 2023-11-10 NOTE — TELEPHONE ENCOUNTER
Patient was walking backwards in her bedroom this morning, she was using her cane, but she tripped and fell on her head/jaw area on the Left side. Patient said that she hit her face down on the floor and her jaw up to her temple hurts. Patient really does not want to go to the ER.  Would like a XR if possible

## 2023-11-16 ENCOUNTER — HOSPITAL ENCOUNTER (OUTPATIENT)
Age: 83
Setting detail: THERAPIES SERIES
Discharge: HOME | End: 2023-11-16
Payer: MEDICARE

## 2023-11-16 PROCEDURE — 97164 PT RE-EVAL EST PLAN CARE: CPT

## 2023-11-16 PROCEDURE — 97110 THERAPEUTIC EXERCISES: CPT

## 2023-11-16 NOTE — FLOWSHEET NOTE
treatments: Work on core and LE ROM and strength, balance and safe mobility      Time In:09:40  AM            Time Out:  10:45 AM    Electronically signed by:  Fran Humphrey PT

## 2023-11-22 ENCOUNTER — HOSPITAL ENCOUNTER (OUTPATIENT)
Age: 83
Setting detail: THERAPIES SERIES
Discharge: HOME | End: 2023-11-22
Payer: MEDICARE

## 2023-11-22 PROCEDURE — 97110 THERAPEUTIC EXERCISES: CPT

## 2023-11-22 NOTE — FLOWSHEET NOTE
mobility      Time In: 4:30 pm           Time Out:  5:15 pm     Electronically signed by:  William Graf PTA

## 2023-11-24 ENCOUNTER — HOSPITAL ENCOUNTER (OUTPATIENT)
Age: 83
Setting detail: THERAPIES SERIES
Discharge: HOME | End: 2023-11-24
Payer: MEDICARE

## 2023-11-24 PROCEDURE — 97110 THERAPEUTIC EXERCISES: CPT

## 2023-11-24 NOTE — FLOWSHEET NOTE
[] [address]  [address] [facility].  [phone]  F: [phone] [] [address] [facility]  [address]   [address]  P: [phone]  F: [phone] [] [address]  [address] [facility]  P: [phone]  F: [phone] [] [place]: [phone]  F: [phone] [] [address] [facility]  [address] [facility]   [address]   P: [phone]  F: [phone]  [] [address] [facility].   P: [phone]  F: [phone] [x] [address] [facility]  [address]  P: [phone]  F: [phone] [] [address] [facility]  [address] [facility]  [place]: [phone]  F: [phone] [] [address]  [place]: [phone]  F: [phone]      Physical Therapy Daily Treatment Note    Date:  2023  Patient Name:  Lucila Fabian    :  1940  MRN: [id number]  Physician: Nanette Encarnacion MD                               Insurance: Prior auth required after evaluation needed for outpt physical therapy with [facility])  23  8 visits approved 23--24  Medical Diagnosis: R26.81   Gait Instability;  G62.9, R29.898                        Rehab Codes: R26.81, M54.50, R26.89  Onset Date: 2021                                  Next 's appt: TBD  Visit# / total visits: (9 total)    visit 3/                   Cancels/No Shows: 0/0; Progress note due at visit  (approved 8 visits 23-24)  Cancels/No Shows: 0/0    Subjective:     Comments:Patient reports

## 2023-11-28 ENCOUNTER — HOSPITAL ENCOUNTER (OUTPATIENT)
Age: 83
Setting detail: THERAPIES SERIES
Discharge: HOME | End: 2023-11-28
Payer: MEDICARE

## 2023-11-28 PROCEDURE — 97110 THERAPEUTIC EXERCISES: CPT

## 2023-11-28 NOTE — FLOWSHEET NOTE
[] [address]  [address] [facility].  [phone]  F: [phone] [] [address] [facility]  [address]  P: [phone]  F: [phone] [] [address]  [address] [facility]  P: [phone]  F: [phone] [] [place]: [phone]  F: [phone] [] [address]  [address] [facility]   [address]   P: [phone]  F: [phone]  [] [address].   P: [phone]  F: [phone] [x] [address] [facility]  [address]  P: [phone]  F: [phone] [] [address] [facility]  [address] [facility]  [place]: [phone]  F: [phone] [] [address]  [place]: [phone]  F: [phone]      Physical Therapy Daily Treatment Note    Date:  2023  Patient Name:  Tristin Bashir    :  1940  MRN: [id number]  Physician: Iggy Ruiz MD                               Insurance: Prior auth required after evaluation needed for outpt physical therapy with [facility])  23  8 visits approved 23--24  Medical Diagnosis: R26.81   Gait Instability;  G62.9, R29.898                        Rehab Codes: R26.81, M54.50, R26.89  Onset Date: 2021                                  Next 's appt: TBD  Visit# / total visits: (10 total)    visit                    Cancels/No Shows: 0/0; Progress note due at visit  (approved 8 visits 23-24)  Cancels/No Shows: 0/0    Subjective:     Comments:Patient reports

## 2023-11-29 DIAGNOSIS — G62.9 SMALL FIBER NEUROPATHY: Primary | ICD-10-CM

## 2023-11-29 RX ORDER — OXYCODONE AND ACETAMINOPHEN 7.5; 325 MG/1; MG/1
1 TABLET ORAL EVERY 4 HOURS PRN
COMMUNITY
End: 2023-11-29 | Stop reason: SDUPTHER

## 2023-11-29 RX ORDER — OXYCODONE AND ACETAMINOPHEN 7.5; 325 MG/1; MG/1
1 TABLET ORAL EVERY 4 HOURS PRN
Qty: 150 TABLET | Refills: 0 | Status: SHIPPED | OUTPATIENT
Start: 2023-11-29 | End: 2023-12-29

## 2023-11-29 NOTE — TELEPHONE ENCOUNTER
Lisa Mcdermott is calling to request a refill on the following medication(s):    Medication Request:  Requested Prescriptions     Pending Prescriptions Disp Refills    oxyCODONE-acetaminophen (PERCOCET) 7.5-325 MG per tablet 150 tablet 0     Sig: Take 1 tablet by mouth every 4 hours as needed for Pain for up to 30 days.  Max Daily Amount: 6 tablets       Last Visit Date (If Applicable):  42/6/8052    Next Visit Date:    12/18/2023

## 2023-12-01 ENCOUNTER — HOSPITAL ENCOUNTER (OUTPATIENT)
Age: 83
Setting detail: THERAPIES SERIES
Discharge: HOME | End: 2023-12-01

## 2023-12-01 NOTE — FLOWSHEET NOTE
without increased pain  Patient to be independent with home exercise program as demonstrated by performance with correct form without cues. Demonstrate Knowledge of fall prevention  MET 8/15/23  LTG: (to be met in 24 treatments)  Decrease low back pain to 3/10 at worst to facilitate walking and driving  Improve Tinetti score to 21/28 or 25% impaired to prevent falls/improve safety  Improve TUG TEST time to 16 seconds to improve mobility. Improve BACK INDEX to 20% impaired at worst to facilitate driving and walking     Patient goals: Decrease pain,  Improve strength, balance, and walking       Pt. Education:  [x] Yes  [] No  [] Reviewed Prior HEP/Ed  Method of Education: [x] Verbal  [x] Demo  [x] Written  Comprehension of Education:  [x] Verbalizes understanding. [] Demonstrates understanding. [] Needs review. [] Demonstrates/verbalizes HEP/Ed previously given. Access Code: [email or web address]  URL: [email or web address]  Date: 11/16/2023  Prepared by: John Storm    Exercises  - Supine Bridge  - 2 x daily - 7 x weekly - 1 sets - 10 reps  - Supine Posterior Pelvic Tilt  - 2 x daily - 7 x weekly - 1 sets - 10 reps  - Supine Straight Leg Raises  - 2 x daily - 7 x weekly - 1 sets - 10 reps  - Supine Hip Adduction Isometric with Ball  - 2 x daily - 7 x weekly - 1 sets - 10 reps  - Sidelying Hip Abduction  - 2 x daily - 7 x weekly - 1 sets - 10 reps  - Clamshell  - 2 x daily - 7 x weekly - 1 sets - 10 reps  - Prone Hip Extension  - 2 x daily - 7 x weekly - 1 sets - 10 reps    Treatment Plan:  [x] Therapeutic Exercise   16395             [] Iontophoresis: 4 mg/mL Dexamethasone Sodium Phosphate  mAmin  02621   [x] Therapeutic Activity  33786 [] Vasopneumatic cold with compression  92613               [x] Gait Training    00510 [] Ultrasound        [id number]   [x] Neuromuscular Re-education  65368 [x] Electrical Stimulation Unattended  30253   [x] Manual Therapy  40033 [] Electrical Stimulation Attended  40475   [x]

## 2023-12-05 ENCOUNTER — HOSPITAL ENCOUNTER (OUTPATIENT)
Age: 83
Setting detail: THERAPIES SERIES
Discharge: HOME | End: 2023-12-05
Payer: MEDICARE

## 2023-12-05 PROCEDURE — 97110 THERAPEUTIC EXERCISES: CPT

## 2023-12-08 ENCOUNTER — HOSPITAL ENCOUNTER (OUTPATIENT)
Age: 83
Setting detail: THERAPIES SERIES
Discharge: HOME | End: 2023-12-08
Payer: MEDICARE

## 2023-12-08 PROCEDURE — 97110 THERAPEUTIC EXERCISES: CPT

## 2023-12-08 NOTE — FLOWSHEET NOTE
[] [address]  [address] [facility].  [phone]  F: [phone] [] [address] [facility]  [address]   [address]  P: [phone]  F: [phone] [] [address]  [address] [facility]  P: [phone]  F: [phone] [] [place]: [phone]  F: [phone] [] [address]  [address] [facility]   [address]   P: [phone]  F: [phone]  [] [address] [facility].   P: [phone]  F: [phone] [x] [address] [facility]  [address]  P: [phone]  F: [phone] [] [address] [facility]  [address] [facility]  [place]: [phone]  F: [phone] [] [address] [facility] [address]  [place]: [phone]  F: [phone]      Physical Therapy Daily Treatment Note    Date:  2023  Patient Name:  Joe Goldberg    :  1940  MRN: [id number]  Physician: Santiago Ascencio MD                               Insurance: Prior auth required after evaluation needed for outpt physical therapy with [facility])  23  8 visits approved 23--24  Medical Diagnosis: R26.81   Gait Instability;  G62.9, R29.898                        Rehab Codes: R26.81, M54.50, R26.89  Onset Date: 2021                                  Next 's appt: TBD  Visit# / total visits:   visit                    Cancels/No Shows: 0/0; Progress note due at visit  (approved 8 visits 23-24)  Cancels/No Shows: 0/0    Subjective:     Comments:   not sure if balance is

## 2023-12-12 ENCOUNTER — HOSPITAL ENCOUNTER (OUTPATIENT)
Age: 83
Setting detail: THERAPIES SERIES
Discharge: HOME | End: 2023-12-12
Payer: MEDICARE

## 2023-12-12 ENCOUNTER — HOSPITAL ENCOUNTER (OUTPATIENT)
Age: 83
Discharge: HOME | End: 2023-12-12
Payer: MEDICARE

## 2023-12-12 DIAGNOSIS — R63.4 WEIGHT LOSS: ICD-10-CM

## 2023-12-12 DIAGNOSIS — G62.9 SMALL FIBER NEUROPATHY: ICD-10-CM

## 2023-12-12 DIAGNOSIS — R26.81 UNSTEADY GAIT WHEN WALKING: ICD-10-CM

## 2023-12-12 LAB
ALBUMIN SERPL-MCNC: 4.3 G/DL (ref 3.5–5.2)
ALBUMIN/GLOB SERPL: 1.7 {RATIO} (ref 1–2.5)
ALP SERPL-CCNC: 70 U/L (ref 35–104)
ALT SERPL-CCNC: 14 U/L (ref 5–33)
ANION GAP SERPL CALCULATED.3IONS-SCNC: 15 MMOL/L (ref 9–17)
AST SERPL-CCNC: 16 U/L
BASOPHILS # BLD: 0.03 K/UL (ref 0–0.2)
BASOPHILS NFR BLD: 0 % (ref 0–2)
BILIRUB SERPL-MCNC: 0.4 MG/DL (ref 0.3–1.2)
BUN SERPL-MCNC: 20 MG/DL (ref 8–23)
CALCIUM SERPL-MCNC: 9.5 MG/DL (ref 8.6–10.4)
CHLORIDE SERPL-SCNC: 101 MMOL/L (ref 98–107)
CO2 SERPL-SCNC: 23 MMOL/L (ref 20–31)
CREAT SERPL-MCNC: 1.1 MG/DL (ref 0.5–0.9)
EOSINOPHIL # BLD: 0.11 K/UL (ref 0–0.44)
EOSINOPHILS RELATIVE PERCENT: 2 % (ref 1–4)
ERYTHROCYTE [DISTWIDTH] IN BLOOD BY AUTOMATED COUNT: 14.2 % (ref 11.8–14.4)
GFR SERPL CREATININE-BSD FRML MDRD: 50 ML/MIN/1.73M2
GLUCOSE SERPL-MCNC: 114 MG/DL (ref 70–99)
HCT VFR BLD AUTO: 36.5 % (ref 36.3–47.1)
HGB BLD-MCNC: 11.9 G/DL (ref 11.9–15.1)
IMM GRANULOCYTES # BLD AUTO: 0.03 K/UL (ref 0–0.3)
IMM GRANULOCYTES NFR BLD: 0 %
LYMPHOCYTES NFR BLD: 1.47 K/UL (ref 1.1–3.7)
LYMPHOCYTES RELATIVE PERCENT: 21 % (ref 24–43)
MCH RBC QN AUTO: 30.1 PG (ref 25.2–33.5)
MCHC RBC AUTO-ENTMCNC: 32.6 G/DL (ref 28.4–34.8)
MCV RBC AUTO: 92.4 FL (ref 82.6–102.9)
MONOCYTES NFR BLD: 0.6 K/UL (ref 0.1–1.2)
MONOCYTES NFR BLD: 9 % (ref 3–12)
NEUTROPHILS NFR BLD: 68 % (ref 36–65)
NEUTS SEG NFR BLD: 4.86 K/UL (ref 1.5–8.1)
NRBC BLD-RTO: 0 PER 100 WBC
PLATELET # BLD AUTO: 190 K/UL (ref 138–453)
PMV BLD AUTO: 10.6 FL (ref 8.1–13.5)
POTASSIUM SERPL-SCNC: 3.9 MMOL/L (ref 3.7–5.3)
PREALB SERPL-MCNC: 25.6 MG/DL (ref 20–40)
PROT SERPL-MCNC: 6.9 G/DL (ref 6.4–8.3)
RBC # BLD AUTO: 3.95 M/UL (ref 3.95–5.11)
SODIUM SERPL-SCNC: 139 MMOL/L (ref 135–144)
T3FREE SERPL-MCNC: 2.49 PG/ML (ref 2.02–4.43)
TSH SERPL DL<=0.05 MIU/L-ACNC: 0.32 UIU/ML (ref 0.3–5)
VIT B12 SERPL-MCNC: 748 PG/ML (ref 232–1245)
WBC OTHER # BLD: 7.1 K/UL (ref 3.5–11.3)

## 2023-12-12 PROCEDURE — 84134 ASSAY OF PREALBUMIN: CPT

## 2023-12-12 PROCEDURE — 84443 ASSAY THYROID STIM HORMONE: CPT

## 2023-12-12 PROCEDURE — 85025 COMPLETE CBC W/AUTO DIFF WBC: CPT

## 2023-12-12 PROCEDURE — 36415 COLL VENOUS BLD VENIPUNCTURE: CPT

## 2023-12-12 PROCEDURE — 80053 COMPREHEN METABOLIC PANEL: CPT

## 2023-12-12 PROCEDURE — 97110 THERAPEUTIC EXERCISES: CPT

## 2023-12-12 PROCEDURE — 84481 FREE ASSAY (FT-3): CPT

## 2023-12-12 PROCEDURE — 82607 VITAMIN B-12: CPT

## 2023-12-12 NOTE — FLOWSHEET NOTE
Supine Bridge  - 2 x daily - 7 x weekly - 1 sets - 10 reps  - Supine Posterior Pelvic Tilt  - 2 x daily - 7 x weekly - 1 sets - 10 reps  - Supine Straight Leg Raises  - 2 x daily - 7 x weekly - 1 sets - 10 reps  - Supine Hip Adduction Isometric with Ball  - 2 x daily - 7 x weekly - 1 sets - 10 reps  - Sidelying Hip Abduction  - 2 x daily - 7 x weekly - 1 sets - 10 reps  - Clamshell  - 2 x daily - 7 x weekly - 1 sets - 10 reps  - Prone Hip Extension  - 2 x daily - 7 x weekly - 1 sets - 10 reps    Treatment Plan:  [x] Therapeutic Exercise   41394             [] Iontophoresis: 4 mg/mL Dexamethasone Sodium Phosphate  mAmin  14794   [x] Therapeutic Activity  57078 [] Vasopneumatic cold with compression  35987               [x] Gait Training    60222 [] Ultrasound        [id number]   [x] Neuromuscular Re-education  61561 [x] Electrical Stimulation Unattended  94010   [x] Manual Therapy  46715 [] Electrical Stimulation Attended  73988   [x] Instruction in HEP       [] Lumbar/Cervical Traction  60367   [] Aquatic Therapy           15997 [x] Cold/hotpack     [] Massage   23478      [] Dry Needling, 1 or 2 muscles  30429   [] Biofeedback, first 15 minutes   49261  [] Biofeedback, additional 15 minutes   01124 [] Dry Needling, 3 or more muscles  05761        Plan: [x] Continue current frequency toward long and short term goals.     [x] Specific Instructions for subsequent treatments: Work on core and LE ROM and strength, balance and safe mobility      Time In:  10:15 am           Time Out:  11:10  am     Electronically signed by:  Diamante Booth, PT

## 2023-12-15 ENCOUNTER — HOSPITAL ENCOUNTER (OUTPATIENT)
Age: 83
Setting detail: THERAPIES SERIES
Discharge: HOME | End: 2023-12-15
Payer: MEDICARE

## 2023-12-15 PROCEDURE — 97110 THERAPEUTIC EXERCISES: CPT

## 2023-12-15 NOTE — FLOWSHEET NOTE
[] [address]  [address] [facility].  [phone]  F: [phone] [] [address] [facility]  [address]  P: [phone]  F: [phone] [] [address]  [address] [facility]  P: [phone]  F: [phone] [] [place]: [phone]  F: [phone] [] [address] [facility]  [address] [facility]   [address]   P: [phone]  F: [phone]  [] [address] [facility].   P: [phone]  F: [phone] [x] [address] [facility]  [address]  P: [phone]  F: [phone] [] [address] [facility]  [address] [facility]  [place]: [phone]  F: [phone] [] [address]  [place]: [phone]  F: [phone]      Physical Therapy Daily Treatment Note/Progress Note    Date:  12/15/2023  Patient Name:  David Glover    :  1940  MRN: [id number]  Physician: Mel Villa MD                               Insurance: Prior auth required after evaluation needed for outpt physical therapy with [facility])  23  8 visits approved 23--24;   4 more visits approved on 23  until 1/10/24  Medical Diagnosis: R26.81   Gait Instability;  G62.9, R29.898                        Rehab Codes: R26.81, M54.50, R26.89  Onset Date: 2021                                  Next 's appt: TBD  Visit# / total visits:   visit                    Cancels/No Shows: 0/0; Cancels/No Shows: 0/0    Subjective:     Comments:   Pt reports she has been

## 2023-12-22 ENCOUNTER — TELEPHONE (OUTPATIENT)
Dept: PODIATRY | Age: 83
End: 2023-12-22

## 2023-12-22 NOTE — TELEPHONE ENCOUNTER
Patients daughter, Pam Melton, called to schedule a new patient appointment with Dr Elo Lino. Her mom has two non healing ulcers on her toes and would like to be seen as soon as possible. She states she burned her toes on a ceramic heater about 8 months ago. Please return patients call at the earliest convenience to discuss. Call back # [phone]. Thank you.

## 2023-12-28 ENCOUNTER — TELEPHONE (OUTPATIENT)
Age: 83
End: 2023-12-28

## 2023-12-28 ENCOUNTER — HOSPITAL ENCOUNTER (OUTPATIENT)
Age: 83
Setting detail: THERAPIES SERIES
Discharge: HOME | End: 2023-12-28
Payer: MEDICARE

## 2023-12-28 NOTE — TELEPHONE ENCOUNTER
Spoke with daughter, she is going to talk to her mom to see if she wants something called in for her but she states that the nausea and diarrhea both seem to be getting better so she's unsure if she will need anything called in. She still needs to know if it will be ok for her to skip her meds if necessary.

## 2023-12-28 NOTE — TELEPHONE ENCOUNTER
Lewis Maldonadon daughter ([phone])CW calling because her mom as the stomach flu that everyone in the family has had. She has been vomiting. She wants to know if she is not able to take her night medications tonight will that be a problem for her?

## 2023-12-28 NOTE — TELEPHONE ENCOUNTER
She takes Metformin, Carvedilol, Vitamin D3, Famotidine, fexofenadine, Levothyroxine all in the evening. Her daughter says she is doing much better.

## 2023-12-28 NOTE — FLOWSHEET NOTE
[] [address]  [address] [facility].  [phone]  F: [phone] [] [address] [facility]  [address]  P: [phone]  F: [phone] [] [address]  [address] [facility]  P: [phone]  F: [phone] [] Ryan Lesley: [phone]  F: [phone] [] [address] [facility]  [facility]   [address]   P: [phone]  F: [phone]  [] [address] [facility]   P: [phone]  F: [phone] [x] [address] [facility]  [address] [facility] [address] [facility]   [address]  P: [phone]  F: [phone] [] [address] [facility]  [address] [facility]  Cinderella Ou: [phone]  F: [phone] [] [address]  [place]inderella Ou: [phone]  F: [phone]      Therapy Cancel/No Show note    Date: 2023  Patient: Gene Camacho  : 1940  MRN: [id number]    Cancels/No Shows to date:     For today's appointment patient:    [x]  Cancelled    [] Rescheduled appointment    [] No-show     Reason given by patient:    [x]  Patient ill    []  Conflicting appointment    [] No transportation      [] Conflict with work    [] No reason given    [] Weather related    [] COVID-19    [] Other:      Comments:        [x] Next appointment was confirmed    Electronically signed by: Rona Collins PT

## 2024-01-04 ENCOUNTER — HOSPITAL ENCOUNTER (OUTPATIENT)
Age: 84
Setting detail: THERAPIES SERIES
Discharge: HOME | End: 2024-01-04
Payer: MEDICARE

## 2024-01-04 DIAGNOSIS — G62.9 POLYNEUROPATHY: Primary | ICD-10-CM

## 2024-01-04 PROCEDURE — 97110 THERAPEUTIC EXERCISES: CPT

## 2024-01-04 RX ORDER — OXYCODONE AND ACETAMINOPHEN 7.5; 325 MG/1; MG/1
1 TABLET ORAL EVERY 4 HOURS PRN
COMMUNITY
End: 2024-01-04 | Stop reason: SDUPTHER

## 2024-01-04 NOTE — FLOWSHEET NOTE
[] [facility]  Outpatient Rehabilitation &  Therapy  [address]  [phone]  F: [phone] [] [facility]  Outpatient Rehabilitation &  Therapy  [address] [facility]   [address]  P: [phone]  F: [phone] [] [facility]  Outpatient Rehabilitation &  Therapy  [address]  P: [phone]  F: [phone] [] [facility]  Outpatient Rehabilitation &  Therapy  [address]  P: [phone]  F: [phone] [] [facility]  Outpatient Rehabilitation &  Therapy  [address]   P: [phone]  F: [phone]  [] [facility]  Outpatient Rehabilitation &  Therapy  [address]   P: [phone]  F: [phone] [x] [facility]  Outpatient Rehabilitation &  Therapy  [address]  P: [phone]  F: [phone] [] [facility]  Outpatient Rehabilitation &  Therapy  [address] [facility]  [address]  P: [phone]  F: [phone] [] [facility]  Outpatient Rehabilitation &  Therapy  [address]  P: [phone]  F: [phone]      Physical Therapy Daily Treatment Note/Progress Note    Date:  2024  Patient Name:  Rachelle East    :  1940  MRN: [id number]  Physician: Kunal Campbell MD                               Insurance: Prior auth required after evaluation needed for outpt physical therapy with Darcy (aim)  23  8 visits approved 23--24;   4 more visits approved on 23  until 1/10/24 (12 total)  Medical Diagnosis: R26.81   Gait Instability;  G62.9, R29.898                        Rehab Codes: R26.81, M54.50, R26.89  Onset Date: 2021                                  Next 's appt: TBD  Visit# / total visits:   visit                    Cancels/No Shows: 0/0; Cancels/No Shows: 0/0    Subjective:     Comments:   Pt reports she

## 2024-01-04 NOTE — TELEPHONE ENCOUNTER
Rachelle East is calling to request a refill on the following medication(s):    Medication Request:  Requested Prescriptions     Pending Prescriptions Disp Refills    oxyCODONE-acetaminophen (PERCOCET) 7.5-325 MG per tablet       Sig: Take 1 tablet by mouth every 4 hours as needed for Pain. Max Daily Amount: 6 tablets       Last Visit Date (If Applicable):  12/18/2023    Next Visit Date:    1/12/2024

## 2024-01-05 RX ORDER — OXYCODONE AND ACETAMINOPHEN 7.5; 325 MG/1; MG/1
1 TABLET ORAL EVERY 4 HOURS PRN
Qty: 180 TABLET | Refills: 0 | Status: SHIPPED | OUTPATIENT
Start: 2024-01-05 | End: 2024-02-04

## 2024-01-09 ENCOUNTER — HOSPITAL ENCOUNTER (OUTPATIENT)
Age: 84
Setting detail: THERAPIES SERIES
Discharge: HOME | End: 2024-01-09
Payer: MEDICARE

## 2024-01-09 PROCEDURE — 97110 THERAPEUTIC EXERCISES: CPT

## 2024-01-09 RX ORDER — BUPROPION HYDROCHLORIDE 150 MG/1
150 TABLET ORAL EVERY MORNING
Qty: 30 TABLET | Refills: 5 | Status: SHIPPED | OUTPATIENT
Start: 2024-01-09 | End: 2024-01-12

## 2024-01-09 NOTE — FLOWSHEET NOTE
[x] Specific Instructions for subsequent treatments: Work on core and LE ROM and strength, balance and safe mobility      Time In:  11:00 am           Time Out:  12:00   pm     Electronically signed by:  Luis Rhoades PT

## 2024-01-09 NOTE — TELEPHONE ENCOUNTER
Rachelle Eats is calling to request a refill on the following medication(s):    Medication Request:  Requested Prescriptions     Pending Prescriptions Disp Refills    buPROPion (WELLBUTRIN XL) 150 MG extended release tablet [Pharmacy Med Name: buPROPion HCL  MG TABLET] 30 tablet      Sig: TAKE ONE TABLET BY MOUTH EVERY MORNING       Last Visit Date (If Applicable):  12/18/2023    Next Visit Date:    1/12/2024

## 2024-01-11 ENCOUNTER — HOSPITAL ENCOUNTER (OUTPATIENT)
Age: 84
Setting detail: THERAPIES SERIES
Discharge: HOME | End: 2024-01-11
Payer: MEDICARE

## 2024-01-11 DIAGNOSIS — F41.9 ANXIETY: Primary | ICD-10-CM

## 2024-01-11 PROCEDURE — 97110 THERAPEUTIC EXERCISES: CPT

## 2024-01-11 NOTE — FLOWSHEET NOTE
frequency toward long and short term goals.    [x] Specific Instructions for subsequent treatments: Work on core and LE ROM and strength, balance and safe mobility      Time In:  10:15 am           Time Out:  11:15   am     Electronically signed by:  Luis Rhoades PT

## 2024-01-11 NOTE — TELEPHONE ENCOUNTER
Rachelle East is calling to request a refill on the following medication(s):    Medication Request:  Requested Prescriptions     Pending Prescriptions Disp Refills    isosorbide mononitrate (IMDUR) 30 MG extended release tablet [Pharmacy Med Name: ISOSORBIDE MONONIT ER 30 MG TB] 30 tablet      Sig: TAKE ONE TABLET BY MOUTH EVERY MORNING       Last Visit Date (If Applicable):  12/18/2023    Next Visit Date:    1/12/2024

## 2024-01-11 NOTE — TELEPHONE ENCOUNTER
Rachelle East is calling to request a refill on the following medication(s):    Medication Request:  Requested Prescriptions     Pending Prescriptions Disp Refills    LORazepam (ATIVAN) 1 MG tablet       Sig: Take 1 tablet by mouth every 6 hours as needed for Anxiety. Max Daily Amount: 4 mg       Last Visit Date (If Applicable):  12/18/2023    Next Visit Date:    1/12/2024

## 2024-01-12 ENCOUNTER — OFFICE VISIT (OUTPATIENT)
Age: 84
End: 2024-01-12

## 2024-01-12 VITALS
SYSTOLIC BLOOD PRESSURE: 130 MMHG | RESPIRATION RATE: 18 BRPM | HEART RATE: 83 BPM | WEIGHT: 178.6 LBS | TEMPERATURE: 97.2 F | BODY MASS INDEX: 32.87 KG/M2 | OXYGEN SATURATION: 96 % | DIASTOLIC BLOOD PRESSURE: 82 MMHG | HEIGHT: 62 IN

## 2024-01-12 DIAGNOSIS — L97.528 ULCER OF LEFT FOOT WITH OTHER SEVERITY (HCC): ICD-10-CM

## 2024-01-12 DIAGNOSIS — R13.19 ESOPHAGEAL DYSPHAGIA: ICD-10-CM

## 2024-01-12 DIAGNOSIS — Z00.00 INITIAL MEDICARE ANNUAL WELLNESS VISIT: Primary | ICD-10-CM

## 2024-01-12 DIAGNOSIS — R26.81 GAIT INSTABILITY: ICD-10-CM

## 2024-01-12 DIAGNOSIS — R63.4 UNEXPLAINED WEIGHT LOSS: ICD-10-CM

## 2024-01-12 RX ORDER — ISOSORBIDE MONONITRATE 30 MG/1
30 TABLET, EXTENDED RELEASE ORAL EVERY MORNING
Qty: 30 TABLET | Refills: 3 | Status: SHIPPED | OUTPATIENT
Start: 2024-01-12

## 2024-01-12 RX ORDER — LORAZEPAM 1 MG/1
1 TABLET ORAL EVERY 8 HOURS PRN
Qty: 90 TABLET | Refills: 1 | Status: SHIPPED | OUTPATIENT
Start: 2024-01-12 | End: 2024-03-12

## 2024-01-12 ASSESSMENT — PATIENT HEALTH QUESTIONNAIRE - PHQ9
9. THOUGHTS THAT YOU WOULD BE BETTER OFF DEAD, OR OF HURTING YOURSELF: 0
SUM OF ALL RESPONSES TO PHQ QUESTIONS 1-9: 0
10. IF YOU CHECKED OFF ANY PROBLEMS, HOW DIFFICULT HAVE THESE PROBLEMS MADE IT FOR YOU TO DO YOUR WORK, TAKE CARE OF THINGS AT HOME, OR GET ALONG WITH OTHER PEOPLE: 0
7. TROUBLE CONCENTRATING ON THINGS, SUCH AS READING THE NEWSPAPER OR WATCHING TELEVISION: 0
1. LITTLE INTEREST OR PLEASURE IN DOING THINGS: 0
SUM OF ALL RESPONSES TO PHQ QUESTIONS 1-9: 0
2. FEELING DOWN, DEPRESSED OR HOPELESS: 0
SUM OF ALL RESPONSES TO PHQ9 QUESTIONS 1 & 2: 0
8. MOVING OR SPEAKING SO SLOWLY THAT OTHER PEOPLE COULD HAVE NOTICED. OR THE OPPOSITE, BEING SO FIGETY OR RESTLESS THAT YOU HAVE BEEN MOVING AROUND A LOT MORE THAN USUAL: 0
4. FEELING TIRED OR HAVING LITTLE ENERGY: 0
6. FEELING BAD ABOUT YOURSELF - OR THAT YOU ARE A FAILURE OR HAVE LET YOURSELF OR YOUR FAMILY DOWN: 0
5. POOR APPETITE OR OVEREATING: 0
3. TROUBLE FALLING OR STAYING ASLEEP: 0

## 2024-01-12 ASSESSMENT — LIFESTYLE VARIABLES
HOW OFTEN DO YOU HAVE A DRINK CONTAINING ALCOHOL: NEVER
HOW MANY STANDARD DRINKS CONTAINING ALCOHOL DO YOU HAVE ON A TYPICAL DAY: PATIENT DOES NOT DRINK

## 2024-01-12 NOTE — PROGRESS NOTES
Abdominal:      General: Bowel sounds are normal.      Palpations: Abdomen is soft.   Musculoskeletal:         General: Normal range of motion.   Neurological:      Mental Status: She is alert and oriented to person, place, and time.   Psychiatric:         Mood and Affect: Mood normal.           ASSESSMENT/PLAN     1. Initial Medicare annual wellness visit  2. Ulcer of left foot with other severity (HCC)  3. Unexplained weight loss  -     CT HEAD WO CONTRAST; Future  -     CT SOFT TISSUE NECK WO CONTRAST; Future  -     CT CHEST WO CONTRAST; Future  -     CT ABDOMEN PELVIS WO CONTRAST Additional Contrast? None; Future  -     FL MODIFIED BARIUM SWALLOW W VIDEO; Future  4. Esophageal dysphagia  -     CT HEAD WO CONTRAST; Future  -     CT SOFT TISSUE NECK WO CONTRAST; Future  -     CT CHEST WO CONTRAST; Future  -     CT ABDOMEN PELVIS WO CONTRAST Additional Contrast? None; Future  -     FL MODIFIED BARIUM SWALLOW W VIDEO; Future  5. Gait instability  -     CT HEAD WO CONTRAST; Future  -     CT SOFT TISSUE NECK WO CONTRAST; Future  -     CT CHEST WO CONTRAST; Future  -     CT ABDOMEN PELVIS WO CONTRAST Additional Contrast? None; Future  -     FL MODIFIED BARIUM SWALLOW W VIDEO; Future       No orders of the defined types were placed in this encounter.            No follow-ups on file.      COMMUNICATION:     Disposition and Communication:     Electronically signed by Kunal Campbell MD on 1/14/2024 at 9:12 PM[facility] Annual Well[facility] Visit    Rachelle East is here for Medicare AWV (Patient is here for AMW visit )    Assessment & Plan   Initial Medicare annual wellness visit  Ulcer of left foot with other severity (HCC)  Unexplained weight loss  -     CT HEAD WO CONTRAST; Future  -     CT SOFT TISSUE NECK WO CONTRAST; Future  -     CT CHEST WO CONTRAST; Future  -     CT ABDOMEN PELVIS WO CONTRAST Additional Contrast? None; Future  -     FL MODIFIED BARIUM SWALLOW W VIDEO; Future  Esophageal dysphagia  -     CT HEAD WO 
Daily Assessment

## 2024-01-12 NOTE — PATIENT INSTRUCTIONS
The [employer] [place] [employer]ging. Call 1-308.872.4400 or search The [employer] [place] [employer] Aging online.  You need 1167-2462 mg of calcium and 1500-6972 IU of vitamin D per day. It is possible to meet your calcium requirement with diet alone, but a vitamin D supplement is usually necessary to meet this goal.  When exposed to the sun, use a sunscreen that protects against both UVA and UVB radiation with an SPF of 30 or greater. Reapply every 2 to 3 hours or after sweating, drying off with a towel, or swimming.  Always wear a seat belt when traveling in a car. Always wear a helmet when riding a bicycle or motorcycle.

## 2024-01-16 ENCOUNTER — HOSPITAL ENCOUNTER (OUTPATIENT)
Age: 84
Setting detail: THERAPIES SERIES
Discharge: HOME | End: 2024-01-16
Payer: MEDICARE

## 2024-01-16 PROCEDURE — 97110 THERAPEUTIC EXERCISES: CPT

## 2024-01-16 NOTE — FLOWSHEET NOTE
[] [facility]  Outpatient Rehabilitation &  Therapy  [address]  [phone]  F: [phone] [] [facility]  Outpatient Rehabilitation &  Therapy  [address]   [address]  P: [phone]  F: [phone] [] [facility]  Outpatient Rehabilitation &  Therapy  [address]  P: [phone]  F: [phone] [] [facility]  Outpatient Rehabilitation &  Therapy  [address]  P: [phone]  F: [phone] [] [facility]  Outpatient Rehabilitation &  Therapy  [address]   P: [phone]  F: [phone]  [] [facility]  Outpatient Rehabilitation &  Therapy  [address]   P: [phone]  F: [phone] [x] [facility]  Outpatient Rehabilitation &  Therapy  [address]  P: [phone]  F: [phone] [] [facility]  Outpatient Rehabilitation &  Therapy  [address]  [address]  P: [phone]  F: [phone] [] [facility]  Outpatient Rehabilitation &  Therapy  [address]  P: [phone]  F: [phone]      Physical Therapy Daily Treatment Note/Progress Note    Date:  2024  Patient Name:  Rachelle East    :  1940  MRN: [id number]  Physician: Kunal Campbell MD                               Insurance: Prior auth required after evaluation needed for outpt physical therapy with [place] (aim)  23  8 visits approved 23--24;   4 more visits approved on 23  until 1/10/24 (12 total)  4 more visits approved  until 2/3/24  Medical Diagnosis: R26.81   Gait Instability;  G62.9, R29.898                        Rehab Codes: R26.81, M54.50, R26.89  Onset Date: 2021                                  Next 's appt: TBD  Visit# / total visits:   visit 15/16                   Cancels/No Shows: 0/0; Cancels/No Shows:

## 2024-01-18 ENCOUNTER — HOSPITAL ENCOUNTER (OUTPATIENT)
Age: 84
Setting detail: THERAPIES SERIES
Discharge: HOME | End: 2024-01-18
Payer: MEDICARE

## 2024-01-18 PROCEDURE — 97110 THERAPEUTIC EXERCISES: CPT

## 2024-01-18 NOTE — FLOWSHEET NOTE
[] [facility]  Outpatient Rehabilitation &  Therapy  [address]  [phone]  F: [phone] [] [facility]  Outpatient Rehabilitation &  Therapy  [address] [facility] [address]   [address]  P: [phone]  F: [phone] [] [facility]  Outpatient Rehabilitation &  Therapy  [address]  P: [phone]  F: [phone] [] [facility]  Outpatient Rehabilitation &  Therapy  [address]  P: [phone]  F: [phone] [] [facility]  Outpatient Rehabilitation &  Therapy  [address]   P: [phone]  F: [phone]  [] [facility]  Outpatient Rehabilitation &  Therapy  [address]   P: [phone]  F: [phone] [x] [facility]  Outpatient Rehabilitation &  Therapy  [address]  P: [phone]  F: [phone] [] [facility]  Outpatient Rehabilitation &  Therapy  [address]  P: [phone]  F: [phone] [] [facility]  Outpatient Rehabilitation &  Therapy  [address]  P: [phone]  F: [phone]      Physical Therapy Daily Treatment Note/Progress Note    Date:  2024  Patient Name:  Rachelle East    :  1940  MRN: [id number]  Physician: Kunal Campbell MD                               Insurance: Prior auth required after evaluation needed for outpt physical therapy with [facility] (aim)  23  8 visits approved 23--24;   4 more visits approved on 23  until 1/10/24 (12 total)  4 more visits approved  until 2/3/24  Medical Diagnosis: R26.81   Gait Instability;  G62.9, R29.898                        Rehab Codes: R26.81, M54.50, R26.89  Onset Date: 2021                                  Next 's appt: TBD  Visit# / total visits:   visit                    Cancels/No Shows: 0/0; Cancels/No Shows:

## 2024-01-24 ENCOUNTER — HOSPITAL ENCOUNTER (OUTPATIENT)
Dept: GENERAL RADIOLOGY | Age: 84
Discharge: HOME | End: 2024-01-26
Attending: FAMILY MEDICINE
Payer: MEDICARE

## 2024-01-24 DIAGNOSIS — R13.19 ESOPHAGEAL DYSPHAGIA: ICD-10-CM

## 2024-01-24 DIAGNOSIS — R63.4 UNEXPLAINED WEIGHT LOSS: ICD-10-CM

## 2024-01-24 DIAGNOSIS — R26.81 GAIT INSTABILITY: ICD-10-CM

## 2024-01-24 PROCEDURE — 74230 X-RAY XM SWLNG FUNCJ C+: CPT

## 2024-01-24 PROCEDURE — 92611 MOTION FLUOROSCOPY/SWALLOW: CPT

## 2024-01-24 NOTE — PROCEDURES
Contributing Deficits: Reduced Cricopharyngeal Opening;Esophageal Backflow into the Pharynx;Decreased Esophageal Clearance        Therapy Time:   Individual Concurrent Group Co-treatment   Time In  1050         Time Out  1110         Minutes  20             Jennifer Carpenter M.S., CCC-SLP        Jennifer Carpenter, SLP, 1/24/2024, 11:49 AM

## 2024-01-25 ENCOUNTER — HOSPITAL ENCOUNTER (OUTPATIENT)
Age: 84
Setting detail: THERAPIES SERIES
Discharge: HOME | End: 2024-01-25
Payer: MEDICARE

## 2024-01-25 ENCOUNTER — HOSPITAL ENCOUNTER (OUTPATIENT)
Dept: CT IMAGING | Age: 84
Discharge: HOME | End: 2024-01-27
Attending: FAMILY MEDICINE
Payer: MEDICARE

## 2024-01-25 DIAGNOSIS — R63.4 UNEXPLAINED WEIGHT LOSS: ICD-10-CM

## 2024-01-25 DIAGNOSIS — R13.19 ESOPHAGEAL DYSPHAGIA: ICD-10-CM

## 2024-01-25 DIAGNOSIS — R26.81 GAIT INSTABILITY: ICD-10-CM

## 2024-01-25 PROCEDURE — 70490 CT SOFT TISSUE NECK W/O DYE: CPT

## 2024-01-25 PROCEDURE — 74176 CT ABD & PELVIS W/O CONTRAST: CPT

## 2024-01-25 PROCEDURE — 97110 THERAPEUTIC EXERCISES: CPT

## 2024-01-25 PROCEDURE — 70450 CT HEAD/BRAIN W/O DYE: CPT

## 2024-01-25 NOTE — FLOWSHEET NOTE
[] [facility]  Outpatient Rehabilitation &  Therapy  [address]  [phone]  F: [phone] [] [facility]  Outpatient Rehabilitation &  Therapy  [address]  P: [phone]  F: [phone] [] [facility]  Outpatient Rehabilitation &  Therapy  [address]  P: [phone]  F: [phone] [] [facility]  Outpatient Rehabilitation &  Therapy  [address]  P: [phone]  F: [phone] [] [facility]  Outpatient Rehabilitation &  Therapy  [address]   P: [phone]  F: [phone]  [] [facility]  Outpatient Rehabilitation &  Therapy  [address]   P: [phone]  F: [phone] [x] [facility]  Outpatient Rehabilitation &  Therapy  [address]  P: [phone]  F: [phone] [] [facility]  Outpatient Rehabilitation &  Therapy  [address]  P: [phone]  F: [phone] [] [facility]  Outpatient Rehabilitation &  Therapy  [address]  P: [phone]  F: [phone]      Physical Therapy Daily Treatment Note/Progress Note    Date:  2024  Patient Name:  Rachelle East    :  1940  MRN: [id number]  Physician: Kunal Campbell MD                               Insurance: Prior auth required after evaluation needed for outpt physical therapy with [facility] (aim)  23  8 visits approved 23--24;   4 more visits approved on 23  until 1/10/24 (12 total)  4 more visits approved  until 2/3/24  1/18/24  4 more visits approved 24--24  (20 Total)  Medical Diagnosis: R26.81   Gait Instability;  G62.9, R29.898                        Rehab Codes: R26.81, M54.50, R26.89  Onset Date: 2021                                  Next 's appt: TBD  Visit# / total visits:   visit

## 2024-01-30 ENCOUNTER — HOSPITAL ENCOUNTER (OUTPATIENT)
Age: 84
Setting detail: THERAPIES SERIES
Discharge: HOME | End: 2024-01-30
Payer: MEDICARE

## 2024-01-30 PROCEDURE — 97110 THERAPEUTIC EXERCISES: CPT

## 2024-01-30 NOTE — FLOWSHEET NOTE
worst to facilitate driving and walking  1/18/24 scored 12/50 or 24% impaired;  1/4/24 Goal Met;  Improved to 4% impaired; 12/12/23  Not Met but improved to 36% impaired,  originally scored 38% Impaired     Patient goals: Decrease pain,  Improve strength, balance, and walking       Pt. Education:  [x] Yes  [] No  [] Reviewed Prior HEP/Ed  Method of Education: [x] Verbal  [x] Demo  [x] Written;  Keep working on HEP daily  Comprehension of Education:  [x] Verbalizes understanding.  Pt much better with HEP;  still needs minimal cues  [] Demonstrates understanding.  [] Needs review.  [x] Demonstrates/verbalizes HEP/Ed previously given. Needs minimal verbal cues for exercises    Access Code: [email or web address]  URL: [email or web address]  Date: 11/16/2023  Prepared by: Luis Rhoades    Exercises  - Supine Bridge  - 2 x daily - 7 x weekly - 1 sets - 10 reps  - Supine Posterior Pelvic Tilt  - 2 x daily - 7 x weekly - 1 sets - 10 reps  - Supine Straight Leg Raises  - 2 x daily - 7 x weekly - 1 sets - 10 reps  - Supine Hip Adduction Isometric with Ball  - 2 x daily - 7 x weekly - 1 sets - 10 reps  - Sidelying Hip Abduction  - 2 x daily - 7 x weekly - 1 sets - 10 reps  - Clamshell  - 2 x daily - 7 x weekly - 1 sets - 10 reps  - Prone Hip Extension  - 2 x daily - 7 x weekly - 1 sets - 10 reps    - Modified Valeriy Stretch  - 2 x daily - 7 x weekly - 1 sets - 4 reps - 30 seconds hold  - Supine Piriformis Stretch with Foot on Ground  - 2 x daily - 7 x weekly - 1 sets - 4 reps - 15 seconds hold  - Supine Hamstring Stretch  - 2 x daily - 7 x weekly - 1 sets - 4 reps - 15 seconds hold    Treatment Plan:  [x] Therapeutic Exercise   68470             [] Iontophoresis: 4 mg/mL Dexamethasone Sodium Phosphate  mAmin  62638   [x] Therapeutic Activity  61878 [] Vasopneumatic cold with compression  90184               [x] Gait Training    14263 [] Ultrasound        34617   [x] Neuromuscular Re-education  74438 [x] Electrical Stimulation

## 2024-02-02 ENCOUNTER — HOSPITAL ENCOUNTER (OUTPATIENT)
Age: 84
Setting detail: THERAPIES SERIES
Discharge: HOME | End: 2024-02-02
Payer: MEDICARE

## 2024-02-02 PROCEDURE — 97110 THERAPEUTIC EXERCISES: CPT

## 2024-02-02 PROCEDURE — 97112 NEUROMUSCULAR REEDUCATION: CPT

## 2024-02-02 NOTE — FLOWSHEET NOTE
[] [facility]  Outpatient Rehabilitation &  Therapy  [address]  [phone]  F: [phone] [] [facility]  Outpatient Rehabilitation &  Therapy  [address]   [address]  P: [phone]  F: [phone] [] [facility]  Outpatient Rehabilitation &  Therapy  [address]  P: [phone]  F: [phone] [] [facility]  Outpatient Rehabilitation &  Therapy  [address]  P: [phone]  F: [phone] [] [facility]  Outpatient Rehabilitation &  Therapy  [address]   P: [phone]  F: [phone]  [] [facility]  Outpatient Rehabilitation &  Therapy  [address]   P: [phone]  F: [phone] [x] [facility]  Outpatient Rehabilitation &  Therapy  [address]  P: [phone]  F: [phone] [] [facility]  Outpatient Rehabilitation &  Therapy  [address]  [address]  P: [phone]  F: [phone] [] [facility]  Outpatient Rehabilitation &  Therapy  [address]  P: [phone]  F: [phone]      Physical Therapy Daily Treatment Note    Date:  2024  Patient Name:  Rachelle East    :  1940  MRN: [id number]  Physician: Kunal Campbell MD                               Insurance: Prior auth required after evaluation needed for outpt physical therapy with [facility] (aim)  23  8 visits approved 23--24;   4 more visits approved on 23  until 1/10/24 (12 total)  4 more visits approved  until 2/3/24  1/18/24  4 more visits approved 24--24  (20 Total)  Medical Diagnosis: R26.81   Gait Instability;  G62.9, R29.898                        Rehab Codes: R26.81, M54.50, R26.89  Onset Date: 2021                                  Next 's appt: TBD  Visit# / total visits:   visit

## 2024-02-05 ENCOUNTER — TELEPHONE (OUTPATIENT)
Age: 84
End: 2024-02-05

## 2024-02-05 RX ORDER — CITALOPRAM 40 MG/1
40 TABLET ORAL DAILY
Qty: 90 TABLET | Refills: 3 | Status: SHIPPED | OUTPATIENT
Start: 2024-02-05

## 2024-02-05 NOTE — TELEPHONE ENCOUNTER
Rachelle East is calling to request a refill on the following medication(s):    Medication Request:  Requested Prescriptions     Pending Prescriptions Disp Refills    citalopram (CELEXA) 40 MG tablet 90 tablet 3     Sig: Take 1 tablet by mouth daily       Last Visit Date (If Applicable):  1/12/2024    Next Visit Date:    2/12/2024

## 2024-02-05 NOTE — TELEPHONE ENCOUNTER
Patient requesting a refill on Citalopram 40mg, 1 tab daily sent to [facility] in [place].

## 2024-02-06 ENCOUNTER — HOSPITAL ENCOUNTER (OUTPATIENT)
Age: 84
Setting detail: THERAPIES SERIES
Discharge: HOME | End: 2024-02-06
Payer: MEDICARE

## 2024-02-06 ENCOUNTER — OFFICE VISIT (OUTPATIENT)
Age: 84
End: 2024-02-06

## 2024-02-06 VITALS — WEIGHT: 178 LBS | HEIGHT: 62 IN | BODY MASS INDEX: 32.76 KG/M2

## 2024-02-06 DIAGNOSIS — S52.571A OTHER CLOSED INTRA-ARTICULAR FRACTURE OF DISTAL END OF RIGHT RADIUS, INITIAL ENCOUNTER: Primary | ICD-10-CM

## 2024-02-06 PROCEDURE — 97112 NEUROMUSCULAR REEDUCATION: CPT

## 2024-02-06 PROCEDURE — 97110 THERAPEUTIC EXERCISES: CPT

## 2024-02-06 RX ADMIN — LIDOCAINE HYDROCHLORIDE 1 ML: 10 INJECTION, SOLUTION INFILTRATION; PERINEURAL at 09:23

## 2024-02-06 RX ADMIN — METHYLPREDNISOLONE ACETATE 80 MG: 80 INJECTION, SUSPENSION INTRA-ARTICULAR; INTRALESIONAL; INTRAMUSCULAR; SOFT TISSUE at 09:23

## 2024-02-06 NOTE — PROGRESS NOTES
data.  Improved strength, Tinetti scores and TUG time.   Despite recent fall, she does feel strength is improving and she is more confident walking with cane.   She does still pose a safety risk and would benefit from continued strength and proprioceptive training.     [] No change.     [] Other:                            [x] Patient would continue to benefit from skilled physical therapy services due to h/o back pain and neuropathy.  She also presents with limited trunk and LE flexibility and strength with deficits in balance and safe mobility.  Skilled PT will focus on core and LE ROM and strength to improve her balance and limited mobility.    Problem list, as detailed above:   [x] ? Pain                       [x] ? ROM                      [x] ? Strength                 [x] ? Function:   [x] ? Balance  [] Edema  [] Postural Deviations  [x] Gait Deviations  [] Other     STG: (to be met in 12 treatments)  ? Pain:of low back and hips to 5/10 at worst to facilitate walking and getting in/out of bed; 12/12/23 Goal Met Improved to 1/10 at present,  4/10 at worst;    ? ROM:of trunk to at least 75% of normal to facilitate walking and ADLs  2/6/24  Not Met but  continues to Improve  ? Strength:of bilateral LE's to at least 4/5 to facilitate walking and climbing steps 2/6/24 Improved  again but not yet met  ? Function:Pt to walk to grocery shop without losing her balance and without increased pain  2/6/24 Goal Met but she needs someone to drive her to the store  Patient to be independent with home exercise program as demonstrated by performance with correct form without cues. 2/6/24 Not Met;  she has improved but still needs some verbal cues to complete standing exercises  Demonstrate Knowledge of fall prevention  MET 8/15/23  LTG: (to be met in 24 treatments)  Decrease low back pain to 3/10 at worst to facilitate walking and driving  1/4/24 Goal Met;  Improved to 3/10 at worst  Improve Tinetti score to 21/28 or 25%

## 2024-02-06 NOTE — PROGRESS NOTES
no machine    Osteoarthritis     Thyroid disease     Thyroid mass     Type 2 diabetes mellitus without complication (HCC)      Past Surgical History:   Procedure Laterality Date    BREAST BIOPSY      EYE SURGERY      FOREARM SURGERY Right 9/7/2023    RIGHT DISTAL RADIUS OPEN REDUCTION INTERNAL FIXATION WITH SYNTHES AND PRE-OP REGIONAL BLOCK performed by Kevin Castano MD at [facility] OR    JOINT REPLACEMENT      KNEE ARTHROPLASTY Left     LAMINECTOMY      TOE SURGERY      TONSILLECTOMY      WRIST FRACTURE SURGERY Right 09/07/2023    RIGHT DISTAL RADIUS OPEN REDUCTION INTERNAL FIXATION WITH SYNTHES AND PRE-OP REGIONAL BLOCK     Family History   Problem Relation Age of Onset    Colon Cancer Father     Cancer Father         Provider Attestation:  I, Kevin Castano, personally performed the services described in this documentation. All medical record entries made by the scribe were at my direction and in my presence. I have reviewed the chart and discharge instructions and agree that the records reflect my personal performance and is accurate and complete.Kevin Castano MD. 02/06/24      Please note that this chart was generated using voice recognition Dragon dictation software.  Although every effort was made to ensure the accuracy of this automated transcription, some errors in transcription may have occurred.

## 2024-02-07 RX ORDER — LIDOCAINE HYDROCHLORIDE 10 MG/ML
1 INJECTION, SOLUTION INFILTRATION; PERINEURAL ONCE
Status: COMPLETED | OUTPATIENT
Start: 2024-02-07 | End: 2024-02-06

## 2024-02-07 RX ORDER — METHYLPREDNISOLONE ACETATE 80 MG/ML
80 INJECTION, SUSPENSION INTRA-ARTICULAR; INTRALESIONAL; INTRAMUSCULAR; SOFT TISSUE ONCE
Status: COMPLETED | OUTPATIENT
Start: 2024-02-07 | End: 2024-02-06

## 2024-02-12 ENCOUNTER — OFFICE VISIT (OUTPATIENT)
Age: 84
End: 2024-02-12
Payer: MEDICARE

## 2024-02-12 ENCOUNTER — HOSPITAL ENCOUNTER (OUTPATIENT)
Age: 84
Setting detail: THERAPIES SERIES
Discharge: HOME | End: 2024-02-12
Payer: MEDICARE

## 2024-02-12 VITALS
HEART RATE: 78 BPM | WEIGHT: 181 LBS | RESPIRATION RATE: 18 BRPM | TEMPERATURE: 98.2 F | BODY MASS INDEX: 33.31 KG/M2 | DIASTOLIC BLOOD PRESSURE: 84 MMHG | HEIGHT: 62 IN | OXYGEN SATURATION: 98 % | SYSTOLIC BLOOD PRESSURE: 128 MMHG

## 2024-02-12 DIAGNOSIS — M10.9 GOUT, UNSPECIFIED CAUSE, UNSPECIFIED CHRONICITY, UNSPECIFIED SITE: ICD-10-CM

## 2024-02-12 DIAGNOSIS — E03.9 HYPOTHYROIDISM, UNSPECIFIED TYPE: ICD-10-CM

## 2024-02-12 DIAGNOSIS — E11.69 TYPE 2 DIABETES MELLITUS WITH OTHER SPECIFIED COMPLICATION, WITHOUT LONG-TERM CURRENT USE OF INSULIN (HCC): ICD-10-CM

## 2024-02-12 DIAGNOSIS — G62.9 SMALL FIBER NEUROPATHY: Primary | ICD-10-CM

## 2024-02-12 DIAGNOSIS — E78.2 MODERATE MIXED HYPERLIPIDEMIA NOT REQUIRING STATIN THERAPY: ICD-10-CM

## 2024-02-12 LAB — PREALBUMIN: 24 MG/DL (ref 20–40)

## 2024-02-12 PROCEDURE — 97112 NEUROMUSCULAR REEDUCATION: CPT

## 2024-02-12 PROCEDURE — 1123F ACP DISCUSS/DSCN MKR DOCD: CPT | Performed by: FAMILY MEDICINE

## 2024-02-12 PROCEDURE — 99214 OFFICE O/P EST MOD 30 MIN: CPT | Performed by: FAMILY MEDICINE

## 2024-02-12 PROCEDURE — 97110 THERAPEUTIC EXERCISES: CPT

## 2024-02-12 PROCEDURE — 3079F DIAST BP 80-89 MM HG: CPT | Performed by: FAMILY MEDICINE

## 2024-02-12 PROCEDURE — 3044F HG A1C LEVEL LT 7.0%: CPT | Performed by: FAMILY MEDICINE

## 2024-02-12 PROCEDURE — 3074F SYST BP LT 130 MM HG: CPT | Performed by: FAMILY MEDICINE

## 2024-02-12 NOTE — PROGRESS NOTES
[facility]     Date of Visit:  2024  Patient Name: Rachelle East   Patient :  1940     CHIEF COMPLAINT/HPI:     Rachelle East is a 83 y.o. female who presents today for an general visit to be evaluated for the following condition(s):  Chief Complaint   Patient presents with    Results     Patient is here today for a 8 week follow up and results from CT scans   Patient is feeling OK.  Patient is getting stronger.  Patient is going to PT twice weekly.  Patient having pain to R side of her naval that is sharp and seems to come and go.  Seems to be improved.  1st time she noticed it was getting up out of bed.  Worse with movement.  Patient did fall getting off a pair of pajamas.    REVIEW OF SYSTEM      Review of Systems   Respiratory:  Negative for chest tightness and shortness of breath.    Cardiovascular:  Negative for chest pain.         REVIEWED INFORMATION      Allergies   Allergen Reactions    Black [place] Pollen Allergy Skin Test Other (See Comments)    Cephalexin Other (See Comments)     Depression      Flavoxate Hcl     Sulfa Antibiotics     Sulphamethoxydiazine Hives    Tizanidine     Penicillins Swelling and Rash       Current Outpatient Medications   Medication Sig Dispense Refill    citalopram (CELEXA) 40 MG tablet Take 1 tablet by mouth daily 90 tablet 3    isosorbide mononitrate (IMDUR) 30 MG extended release tablet TAKE ONE TABLET BY MOUTH EVERY MORNING 30 tablet 3    LORazepam (ATIVAN) 1 MG tablet Take 1 tablet by mouth every 8 hours as needed for Anxiety for up to 60 days. Max Daily Amount: 3 mg 90 tablet 1    buPROPion (WELLBUTRIN XL) 300 MG extended release tablet Take 1 tablet by mouth every morning 30 tablet 5    oxybutynin (DITROPAN) 5 MG tablet TAKE 1 TABLET BY MOUTH TWICE A DAY FOR BLADDER SPASM 60 tablet 5    vitamin B-12 (CYANOCOBALAMIN) 1000 MCG tablet Take 1 tablet by mouth daily      vitamin D 25 MCG (1000 UT) CAPS Take 2 capsules by mouth daily      carvedilol

## 2024-02-12 NOTE — FLOWSHEET NOTE
on Ground  - 2 x daily - 7 x weekly - 1 sets - 4 reps - 15 seconds hold  - Supine Hamstring Stretch  - 2 x daily - 7 x weekly - 1 sets - 4 reps - 15 seconds hold    Treatment Plan:  [x] Therapeutic Exercise   40111             [] Iontophoresis: 4 mg/mL Dexamethasone Sodium Phosphate  mAmin  32220   [x] Therapeutic Activity  00639 [] Vasopneumatic cold with compression  13636               [x] Gait Training    36279 [] Ultrasound        97088   [x] Neuromuscular Re-education  96610 [x] Electrical Stimulation Unattended  48513   [x] Manual Therapy  61953 [] Electrical Stimulation Attended  11911   [x] Instruction in HEP       [] Lumbar/Cervical Traction  13183   [] Aquatic Therapy           54762 [x] Cold/hotpack     [] Massage   62005      [] Dry Needling, 1 or 2 muscles  36829   [] Biofeedback, first 15 minutes   64831  [] Biofeedback, additional 15 minutes   43682 [] Dry Needling, 3 or more muscles  98404        Plan: [x] Continue current frequency toward long and short term goals.    [x] Specific Instructions for subsequent treatments: Work on core and LE ROM and strength, balance and safe mobility      Time In:   3:45pm         Time Out:  4:30pm    Electronically signed by:  Светлана Santiago PTA

## 2024-02-15 ENCOUNTER — HOSPITAL ENCOUNTER (OUTPATIENT)
Age: 84
Setting detail: THERAPIES SERIES
Discharge: HOME | End: 2024-02-15
Payer: MEDICARE

## 2024-02-15 PROCEDURE — 97112 NEUROMUSCULAR REEDUCATION: CPT

## 2024-02-15 PROCEDURE — 97110 THERAPEUTIC EXERCISES: CPT

## 2024-02-15 NOTE — FLOWSHEET NOTE
[] [facility]  Outpatient Rehabilitation &  Therapy  [address]  [phone]  F: [phone] [] [facility]  Outpatient Rehabilitation &  Therapy  [address]   [address]  P: [phone]  F: [phone] [] [facility]  Outpatient Rehabilitation &  Therapy  [address]  P: [phone]  F: [phone] [] [facility]  Outpatient Rehabilitation &  Therapy  [address]  P: [phone]  F: [phone] [] [facility]  Outpatient Rehabilitation &  Therapy  [address]   P: [phone]  F: [phone]  [] [facility]  Outpatient Rehabilitation &  Therapy  [address]   P: [phone]  F: [phone] [x] [facility]  Outpatient Rehabilitation &  Therapy  [address]  P: [phone]  F: [phone] [] [facility]  Outpatient Rehabilitation &  Therapy  [address]  P: [phone]  F: [phone] [] [facility]  Outpatient Rehabilitation &  Therapy  [address]  P: [phone]  F: [phone]      Physical Therapy Daily Treatment Note    Date:  2/15/2024  Patient Name:  Rachelle East    :  1940  MRN: [id number]  Physician: Kunal Campbell MD                               Insurance: Prior auth required after evaluation needed for outpt physical therapy with [facility])  23  8 visits approved 23--24;   4 more visits approved on 23  until 1/10/24 (12 total)  4 more visits approved  until 2/3/24  1/18/24  4 more visits approved 24--24  (20 Total)  4 more visits approved 24-3/6/24  (24 total)  Medical Diagnosis: R26.81   Gait Instability;  G62.9, R29.898                        Rehab Codes: R26.81, M54.50, R26.89  Onset Date: 2021                                  Next 's appt: TBD  Visit# /

## 2024-02-19 ENCOUNTER — HOSPITAL ENCOUNTER (OUTPATIENT)
Age: 84
Setting detail: THERAPIES SERIES
Discharge: HOME | End: 2024-02-19
Payer: MEDICARE

## 2024-02-19 DIAGNOSIS — G62.9 NEUROPATHY: ICD-10-CM

## 2024-02-19 PROCEDURE — 97112 NEUROMUSCULAR REEDUCATION: CPT

## 2024-02-19 PROCEDURE — 97110 THERAPEUTIC EXERCISES: CPT

## 2024-02-19 RX ORDER — OXYCODONE AND ACETAMINOPHEN 7.5; 325 MG/1; MG/1
1 TABLET ORAL EVERY 4 HOURS PRN
Qty: 180 TABLET | Refills: 0 | Status: SHIPPED | OUTPATIENT
Start: 2024-02-19 | End: 2024-03-20

## 2024-02-19 NOTE — PROGRESS NOTES
Script sent  Rachelle East is calling to request a refill on the following medication(s):    Medication Request:  Requested Prescriptions     Pending Prescriptions Disp Refills    oxyCODONE-acetaminophen (PERCOCET) 7.5-325 MG per tablet 180 tablet 0     Sig: Take 1 tablet by mouth every 4 hours as needed for Pain for up to 30 days. Max Daily Amount: 6 tablets       Last Visit Date (If Applicable):  Visit date not found    Next Visit Date:    Visit date not found          Script sent

## 2024-02-19 NOTE — FLOWSHEET NOTE
[] [facility]  Outpatient Rehabilitation &  Therapy  [address]  [phone]  F: [phone] [] [facility]  Outpatient Rehabilitation &  Therapy  [address]   [address]  P: [phone]  F: [phone] [] [facility]  Outpatient Rehabilitation &  Therapy  [address]  P: [phone]  F: [phone] [] [facility]  Outpatient Rehabilitation &  Therapy  [address]  P: [phone]  F: [phone] [] [facility]  Outpatient Rehabilitation &  Therapy  [address]   P: [phone]  F: [phone]  [] [facility]  Outpatient Rehabilitation &  Therapy  [address]   P: [phone]  F: [phone] [x] [facility]  Outpatient Rehabilitation &  Therapy  [address]  P: [phone]  F: [phone] [] [facility]  Outpatient Rehabilitation &  Therapy  [address]  P: [phone]  F: [phone] [] [facility]  Outpatient Rehabilitation &  Therapy  [address]  P: [phone]  F: [phone]      Physical Therapy Daily Treatment Note    Date:  2024  Patient Name:  Rachelle East    :  1940  MRN: [id number]  Physician: Kunal Campbell MD                               Insurance: Prior auth required after evaluation needed for outpt physical therapy with [facility])  23  8 visits approved 23--24;   4 more visits approved on 23  until 1/10/24 (12 total)  4 more visits approved  until 2/3/24  1/18/24  4 more visits approved 24--24  (20 Total)  4 more visits approved 24-3/6/24  (24 total)  Medical Diagnosis: R26.81   Gait Instability;  G62.9, R29.898                        Rehab Codes: R26.81, M54.50, R26.89  Onset Date: 2021                                  Next 's appt: TBD  Visit# /  A-T Advancement Flap Text: The defect edges were debeveled with a #15 scalpel blade.  Given the location of the defect, shape of the defect and the proximity to free margins an A-T advancement flap was deemed most appropriate.  Using a sterile surgical marker, an appropriate advancement flap was drawn incorporating the defect and placing the expected incisions within the relaxed skin tension lines where possible.    The area thus outlined was incised deep to adipose tissue with a #15 scalpel blade.  The skin margins were undermined to an appropriate distance in all directions utilizing iris scissors.

## 2024-02-21 ENCOUNTER — HOSPITAL ENCOUNTER (OUTPATIENT)
Age: 84
Setting detail: THERAPIES SERIES
Discharge: HOME | End: 2024-02-21
Payer: MEDICARE

## 2024-02-21 PROCEDURE — 97112 NEUROMUSCULAR REEDUCATION: CPT

## 2024-02-21 PROCEDURE — 97110 THERAPEUTIC EXERCISES: CPT

## 2024-02-21 NOTE — FLOWSHEET NOTE
[] [facility]  Outpatient Rehabilitation &  Therapy  [address]  [phone]  F: [phone] [] [facility]  Outpatient Rehabilitation &  Therapy  [address]  P: [phone]  F: [phone] [] [facility]  Outpatient Rehabilitation &  Therapy  [address]  P: [phone]  F: [phone] [] [facility]  Outpatient Rehabilitation &  Therapy  [address]  P: [phone]  F: [phone] [] [facility]  Outpatient Rehabilitation &  Therapy  [address]   P: [phone]  F: [phone]  [] [facility]  Outpatient Rehabilitation &  Therapy  [address]   P: [phone]  F: [phone] [x] [facility]  Outpatient Rehabilitation &  Therapy  [address]  P: [phone]  F: [phone] [] [facility]  Outpatient Rehabilitation &  Therapy  [address]  P: [phone]  F: [phone] [] [facility]  Outpatient Rehabilitation &  Therapy  [address]  P: [phone]  F: [phone]      Physical Therapy Daily Treatment Note    Date:  2024  Patient Name:  Rachelle East    :  1940  MRN: [id number]  Physician: Kunal Campbell MD                               Insurance: Prior auth required after evaluation needed for outpt physical therapy with [facility])  23  8 visits approved 23--24;   4 more visits approved on 23  until 1/10/24 (12 total)  4 more visits approved  until 2/3/24  1/18/24  4 more visits approved 24--24  (20 Total)  4 more visits approved 24-3/6/24  (24 total)  Medical Diagnosis: R26.81   Gait Instability;  G62.9, R29.898                        Rehab Codes: R26.81, M54.50, R26.89  Onset Date: 2021                                  Next 's appt: TBD  Visit# /

## 2024-02-22 ENCOUNTER — APPOINTMENT (OUTPATIENT)
Age: 84
End: 2024-02-22
Payer: MEDICARE

## 2024-03-01 ENCOUNTER — HOSPITAL ENCOUNTER (OUTPATIENT)
Age: 84
Setting detail: THERAPIES SERIES
Discharge: HOME | End: 2024-03-01
Payer: MEDICARE

## 2024-03-01 PROCEDURE — 97112 NEUROMUSCULAR REEDUCATION: CPT

## 2024-03-01 PROCEDURE — 97110 THERAPEUTIC EXERCISES: CPT

## 2024-03-01 NOTE — FLOWSHEET NOTE
[] [facility]  Outpatient Rehabilitation &  Therapy  [address]  [phone]  F: [phone] [] [facility]  Outpatient Rehabilitation &  Therapy  [address]  P: [phone]  F: [phone] [] [facility]  Outpatient Rehabilitation &  Therapy  [address]  P: [phone]  F: [phone] [] [facility]  Outpatient Rehabilitation &  Therapy  [address]  P: [phone]  F: [phone] [] [facility]  Outpatient Rehabilitation &  Therapy  [address]   P: [phone]  F: [phone]  [] [facility]  Outpatient Rehabilitation &  Therapy  [address]   P: [phone]  F: [phone] [x] [facility]  Outpatient Rehabilitation &  Therapy  [address]  P: [phone]  F: [phone] [] [facility]  Outpatient Rehabilitation &  Therapy  [address]  P: [phone]  F: [phone] [] [facility]  Outpatient Rehabilitation &  Therapy  [address]  P: [phone]  F: [phone]      Physical Therapy Daily Treatment Note    Date:  3/1/2024  Patient Name:  Rachelle East    :  1940  MRN: [id number]  Physician: Kunal Campbell MD                               Insurance: Prior auth required after evaluation needed for outpt physical therapy with [facility] ([facility])  23  8 visits approved 23--24;   4 more visits approved on 23  until 1/10/24 (12 total)  4 more visits approved  until 2/3/24  1/18/24  4 more visits approved 24--24,  (20 Total), 4 more visits approved 24-3/6/24  (24 total), 4 more visits approved 24-3/21/24 (28 total).  Medical Diagnosis: R26.81   Gait Instability;  G62.9, R29.898                        Rehab Codes: R26.81, M54.50, R26.89  Onset Date: 2021

## 2024-03-05 ENCOUNTER — HOSPITAL ENCOUNTER (OUTPATIENT)
Age: 84
Setting detail: THERAPIES SERIES
Discharge: HOME | End: 2024-03-05
Payer: MEDICARE

## 2024-03-05 PROCEDURE — 97112 NEUROMUSCULAR REEDUCATION: CPT

## 2024-03-05 PROCEDURE — 97110 THERAPEUTIC EXERCISES: CPT

## 2024-03-05 NOTE — FLOWSHEET NOTE
grocery shop without losing her balance and without increased pain  2/6/24 Goal Met but she needs someone to drive her to the store  Patient to be independent with home exercise program as demonstrated by performance with correct form without cues. 2/6/24 Not Met;  she has improved but still needs some verbal cues to complete standing exercises  Demonstrate Knowledge of fall prevention  MET 8/15/23  LTG: (to be met in 24 treatments)  Decrease low back pain to 3/10 at worst to facilitate walking and driving  1/4/24 Goal Met;  Improved to 3/10 at worst  Improve Tinetti score to 21/28 or 25% impaired to prevent falls/improve safety 1/18/24 Not Met but improved to 20/28;   1/4/24 Not met but Improved to 17/28; 12/12/23 Not Met but improved to 13/28,  originally scored 6/28  (19/28 = high Risk of Falling)  Improve TUG TEST time to 16 seconds to improve mobility. 1/18/24  Goal Met;  Improved to 15 seconds;  1/4/24 Not Met but improved to 20 seconds; 12/12/23  Not Met but improved to 26 seconds; originally scored 33 seconds  Improve BACK INDEX to 20% impaired at worst to facilitate driving and walking  1/18/24 scored 12/50 or 24% impaired;  1/4/24 Goal Met;  Improved to 4% impaired; 12/12/23  Not Met but improved to 36% impaired,  originally scored 38% Impaired (2/6: not retested)     Patient goals: Decrease pain,  Improve strength, balance, and walking       Pt. Education:  [x] Yes  [] No  [] Reviewed Prior HEP/Ed  Method of Education: [x] Verbal  [x] Demo  [] Written;  Keep working on HEP daily  Comprehension of Education:  [x] Verbalizes understanding.  Pt much better with HEP;  still needs minimal cues  [] Demonstrates understanding.  [] Needs review.  [x] Demonstrates/verbalizes HEP/Ed previously given. Needs minimal verbal cues for exercises  2/21/24 discussed benefits of returning to 3 wheeled walker use vs st cane due to 2 falls just this week.   2/2/24 No new HEP issued due to focusing more on higher level balance

## 2024-03-08 ENCOUNTER — HOSPITAL ENCOUNTER (OUTPATIENT)
Age: 84
Setting detail: THERAPIES SERIES
Discharge: HOME | End: 2024-03-08
Payer: MEDICARE

## 2024-03-08 PROCEDURE — 97112 NEUROMUSCULAR REEDUCATION: CPT

## 2024-03-08 PROCEDURE — 97110 THERAPEUTIC EXERCISES: CPT

## 2024-03-08 NOTE — FLOWSHEET NOTE
[] [facility]  Outpatient Rehabilitation &  Therapy  [address]  [phone]  F: [phone] [] [facility]  Outpatient Rehabilitation &  Therapy  [address]   [address]  P: [phone]  F: [phone] [] [facility]  Outpatient Rehabilitation &  Therapy  [address]  P: [phone]  F: [phone] [] [facility]  Outpatient Rehabilitation &  Therapy  [address]  P: [phone]  F: [phone] [] [facility]  Outpatient Rehabilitation &  Therapy  [address]   P: [phone]  F: [phone]  [] [facility]  Outpatient Rehabilitation &  Therapy  [address]   P: [phone]  F: [phone] [x] [facility]  Outpatient Rehabilitation &  Therapy  [address]  P: [phone]  F: [phone] [] [facility]  Outpatient Rehabilitation &  Therapy  [address]  P: [phone]  F: [phone] [] [facility]  Outpatient Rehabilitation &  Therapy  [address]  P: [phone]  F: [phone]      Physical Therapy Daily Treatment Note    Date:  3/8/2024  Patient Name:  Rachelle East    :  1940  MRN: [id number]  Physician: Kunal Campbell MD                               Insurance: Prior auth required after evaluation needed for outpt physical therapy with [facility])  23  8 visits approved 23--24;   4 more visits approved on 23  until 1/10/24 (12 total)  4 more visits approved  until 2/3/24  1/18/24  4 more visits approved 24--24,  (20 Total), 4 more visits approved 24-3/6/24  (24 total), 4 more visits approved 24-3/21/24 (28 total).  Medical Diagnosis: R26.81   Gait Instability;  G62.9, R29.898                        Rehab Codes: R26.81, M54.50, R26.89  Onset Date: 2021

## 2024-03-12 ENCOUNTER — HOSPITAL ENCOUNTER (OUTPATIENT)
Age: 84
Setting detail: THERAPIES SERIES
Discharge: HOME | End: 2024-03-12
Payer: MEDICARE

## 2024-03-12 PROCEDURE — 97112 NEUROMUSCULAR REEDUCATION: CPT

## 2024-03-12 PROCEDURE — 97110 THERAPEUTIC EXERCISES: CPT

## 2024-03-12 NOTE — FLOWSHEET NOTE
56077 [] Dry Needling, 3 or more muscles  20561        Plan: [] Continue current frequency toward long and short term goals.    [] Specific Instructions for subsequent treatments: Work on core and LE ROM and strength, balance and safe mobility    Will discharge from PT unless otherwise advised      Time In:  11:00 am   Time Out:  11:55  am  Electronically signed by:  Luis Rhoades PT

## 2024-04-02 DIAGNOSIS — F41.9 ANXIETY: ICD-10-CM

## 2024-04-02 RX ORDER — LORAZEPAM 1 MG/1
1 TABLET ORAL EVERY 8 HOURS PRN
Qty: 90 TABLET | Refills: 0 | Status: SHIPPED | OUTPATIENT
Start: 2024-04-02 | End: 2024-05-02

## 2024-04-02 NOTE — TELEPHONE ENCOUNTER
Rachelle East is calling to request a refill on the following medication(s):    Medication Request:  Requested Prescriptions     Pending Prescriptions Disp Refills    LORazepam (ATIVAN) 1 MG tablet [Pharmacy Med Name: LORazepam 1 MG TABLET] 90 tablet      Sig: Take 1 tablet by mouth every 8 hours as needed for Anxiety.       Last Visit Date (If Applicable):  2/12/2024    Next Visit Date:    5/13/2024

## 2024-04-04 DIAGNOSIS — G62.9 SMALL FIBER NEUROPATHY: Primary | ICD-10-CM

## 2024-04-04 RX ORDER — OXYCODONE AND ACETAMINOPHEN 7.5; 325 MG/1; MG/1
1 TABLET ORAL EVERY 4 HOURS PRN
COMMUNITY
End: 2024-04-04 | Stop reason: SDUPTHER

## 2024-04-04 NOTE — TELEPHONE ENCOUNTER
Rachelle East is calling to request a refill on the following medication(s):    Medication Request:  Requested Prescriptions     Pending Prescriptions Disp Refills    oxyCODONE-acetaminophen (PERCOCET) 7.5-325 MG per tablet 180 tablet 0     Sig: Take 1 tablet by mouth every 4 hours as needed for Pain for up to 30 days. Max Daily Amount: 6 tablets       Last Visit Date (If Applicable):  2/12/2024    Next Visit Date:    5/13/2024

## 2024-04-05 RX ORDER — OXYCODONE AND ACETAMINOPHEN 7.5; 325 MG/1; MG/1
1 TABLET ORAL EVERY 4 HOURS PRN
Qty: 180 TABLET | Refills: 0 | Status: SHIPPED | OUTPATIENT
Start: 2024-04-05 | End: 2024-05-05

## 2024-04-09 RX ORDER — ISOSORBIDE MONONITRATE 30 MG/1
30 TABLET, EXTENDED RELEASE ORAL EVERY MORNING
Qty: 90 TABLET | Refills: 3 | Status: SHIPPED | OUTPATIENT
Start: 2024-04-09

## 2024-04-09 NOTE — TELEPHONE ENCOUNTER
Rachelle East is calling to request a refill on the following medication(s):    Medication Request:  Requested Prescriptions     Pending Prescriptions Disp Refills    isosorbide mononitrate (IMDUR) 30 MG extended release tablet [Pharmacy Med Name: ISOSORBIDE MONONIT ER 30 MG TB] 90 tablet      Sig: TAKE 1 TABLET BY MOUTH EVERY MORNING       Last Visit Date (If Applicable):  2/12/2024    Next Visit Date:    5/13/2024

## 2024-04-15 RX ORDER — OXYBUTYNIN CHLORIDE 5 MG/1
TABLET ORAL
Qty: 60 TABLET | Refills: 5 | Status: SHIPPED | OUTPATIENT
Start: 2024-04-15

## 2024-04-15 NOTE — TELEPHONE ENCOUNTER
Rachelle East is calling to request a refill on the following medication(s):    Medication Request:  Requested Prescriptions     Pending Prescriptions Disp Refills    oxyBUTYnin (DITROPAN) 5 MG tablet [Pharmacy Med Name: oxyBUTYnin 5 MG TABLET] 60 tablet 5     Sig: TAKE 1 TABLET BY MOUTH TWICE A DAY FOR BLADDER SPASM       Last Visit Date (If Applicable):  2/12/2024    Next Visit Date:    5/13/2024

## 2024-05-10 LAB
BASOPHILS ABSOLUTE: 0.02 K/UL (ref 0–0.2)
BASOPHILS RELATIVE PERCENT: 0.4 %
EOSINOPHILS ABSOLUTE: 0.18 K/UL (ref 0–0.5)
EOSINOPHILS RELATIVE PERCENT: 3.6 %
ESTIMATED AVERAGE GLUCOSE: 108 MG/DL
HBA1C MFR BLD: 5.4 % (ref 4.2–5.6)
HCT VFR BLD CALC: 35.6 % (ref 34–45)
HEMOGLOBIN: 11.5 G/DL (ref 11.5–15.5)
IMMATURE GRANS (ABS): 0.03
IMMATURE GRANULOCYTES %: 0.6 %
LYMPHOCYTES ABSOLUTE: 1.06 K/UL (ref 1–4)
LYMPHOCYTES RELATIVE PERCENT: 21.5 %
MCH RBC QN AUTO: 29.1 PG (ref 25–33)
MCHC RBC AUTO-ENTMCNC: 32.3 G/DL (ref 31–36)
MCV RBC AUTO: 90.1 FL (ref 80–99)
MONOCYTES ABSOLUTE: 0.39 K/UL (ref 0.2–1)
MONOCYTES RELATIVE PERCENT: 7.9 %
NEUTROPHILS ABSOLUTE: 3.26 K/UL (ref 1.5–7.5)
NEUTROPHILS RELATIVE PERCENT: 66 %
PDW BLD-RTO: 14.3 % (ref 11.5–15)
PLATELET # BLD: 177 K/UL (ref 130–400)
PMV BLD AUTO: 10.1 FL (ref 9.3–13)
RBC # BLD: 3.95 M/UL (ref 3.8–5.4)
WBC # BLD: 4.9 K/UL (ref 3.5–11)

## 2024-05-11 LAB
ALBUMIN: 4.2 G/DL (ref 3.5–5.2)
ALK PHOSPHATASE: 83 U/L (ref 40–142)
ALT SERPL-CCNC: 13 U/L (ref 5–40)
ANION GAP SERPL CALCULATED.3IONS-SCNC: 14 MEQ/L (ref 7–16)
AST SERPL-CCNC: 15 U/L (ref 9–40)
BILIRUB SERPL-MCNC: 0.2 MG/DL
BUN BLDV-MCNC: 18 MG/DL (ref 8–23)
CALCIUM SERPL-MCNC: 9.4 MG/DL (ref 8.5–10.5)
CHLORIDE BLD-SCNC: 102 MEQ/L (ref 95–107)
CHOLESTEROL, TOTAL: 193 MG/DL
CHOLESTEROL/HDL RATIO: 3.8 RATIO
CO2: 26 MEQ/L (ref 19–31)
CREAT SERPL-MCNC: 1.29 MG/DL (ref 0.6–1.3)
EGFR IF NONAFRICAN AMERICAN: 41 ML/MIN/1.73
GLUCOSE: 106 MG/DL (ref 70–99)
HDLC SERPL-MCNC: 51 MG/DL
LDL CHOLESTEROL: 110 MG/DL
LDL/HDL RATIO: 2.2 RATIO
POTASSIUM SERPL-SCNC: 4.6 MEQ/L (ref 3.5–5.4)
SODIUM BLD-SCNC: 142 MEQ/L (ref 133–146)
TOTAL PROTEIN: 6.5 G/DL (ref 6.1–8.3)
TRIGL SERPL-MCNC: 159 MG/DL
TSH SERPL DL<=0.05 MIU/L-ACNC: 0.81 UIU/ML (ref 0.4–4.1)
URIC ACID: 7.4 MG/DL (ref 2.7–6.1)
VLDLC SERPL CALC-MCNC: 32 MG/DL

## 2024-05-13 ENCOUNTER — OFFICE VISIT (OUTPATIENT)
Age: 84
End: 2024-05-13
Payer: MEDICARE

## 2024-05-13 VITALS
OXYGEN SATURATION: 97 % | HEART RATE: 70 BPM | SYSTOLIC BLOOD PRESSURE: 128 MMHG | DIASTOLIC BLOOD PRESSURE: 80 MMHG | RESPIRATION RATE: 14 BRPM | BODY MASS INDEX: 32.48 KG/M2 | WEIGHT: 177.6 LBS | TEMPERATURE: 97.1 F

## 2024-05-13 DIAGNOSIS — G62.9 SMALL FIBER NEUROPATHY: Primary | ICD-10-CM

## 2024-05-13 DIAGNOSIS — E11.69 TYPE 2 DIABETES MELLITUS WITH OTHER SPECIFIED COMPLICATION, WITHOUT LONG-TERM CURRENT USE OF INSULIN (HCC): ICD-10-CM

## 2024-05-13 DIAGNOSIS — F41.9 ANXIETY: ICD-10-CM

## 2024-05-13 DIAGNOSIS — I10 PRIMARY HYPERTENSION: ICD-10-CM

## 2024-05-13 DIAGNOSIS — E78.2 MODERATE MIXED HYPERLIPIDEMIA NOT REQUIRING STATIN THERAPY: ICD-10-CM

## 2024-05-13 DIAGNOSIS — D47.2 MONOCLONAL GAMMOPATHY OF UNKNOWN SIGNIFICANCE (MGUS): ICD-10-CM

## 2024-05-13 PROCEDURE — 3044F HG A1C LEVEL LT 7.0%: CPT | Performed by: FAMILY MEDICINE

## 2024-05-13 PROCEDURE — 3079F DIAST BP 80-89 MM HG: CPT | Performed by: FAMILY MEDICINE

## 2024-05-13 PROCEDURE — 99214 OFFICE O/P EST MOD 30 MIN: CPT | Performed by: FAMILY MEDICINE

## 2024-05-13 PROCEDURE — 3074F SYST BP LT 130 MM HG: CPT | Performed by: FAMILY MEDICINE

## 2024-05-13 PROCEDURE — 1123F ACP DISCUSS/DSCN MKR DOCD: CPT | Performed by: FAMILY MEDICINE

## 2024-05-13 RX ORDER — CIPROFLOXACIN 500 MG/1
250 TABLET, FILM COATED ORAL 2 TIMES DAILY
COMMUNITY
Start: 2024-05-07

## 2024-05-13 RX ORDER — OXYCODONE AND ACETAMINOPHEN 7.5; 325 MG/1; MG/1
1 TABLET ORAL EVERY 4 HOURS PRN
Qty: 180 TABLET | Refills: 0 | Status: SHIPPED | OUTPATIENT
Start: 2024-05-13 | End: 2024-06-12

## 2024-05-13 RX ORDER — LORAZEPAM 1 MG/1
1 TABLET ORAL EVERY 8 HOURS PRN
COMMUNITY
End: 2024-05-13 | Stop reason: SDUPTHER

## 2024-05-13 RX ORDER — OXYCODONE AND ACETAMINOPHEN 7.5; 325 MG/1; MG/1
1 TABLET ORAL EVERY 6 HOURS PRN
COMMUNITY
End: 2024-05-13 | Stop reason: SDUPTHER

## 2024-05-13 RX ORDER — LORAZEPAM 1 MG/1
1 TABLET ORAL EVERY 8 HOURS PRN
Qty: 90 TABLET | Refills: 2 | Status: SHIPPED | OUTPATIENT
Start: 2024-05-13 | End: 2024-08-11

## 2024-05-13 ASSESSMENT — ENCOUNTER SYMPTOMS
SHORTNESS OF BREATH: 0
CHEST TIGHTNESS: 0

## 2024-05-13 NOTE — PROGRESS NOTES
[facility]     Date of Visit:  2024  Patient Name: Rachelle East   Patient :  1940     CHIEF COMPLAINT/HPI:     Rachelle East is a 84 y.o. female who presents today for an general visit to be evaluated for the following condition(s):  Chief Complaint   Patient presents with    Diabetes    Depression    Hypothyroidism     Patient is   here for  3 month  check up  on  Hypothyroidism/diabetes/depression labs done    Patient states that her neuropathy is getting worse.  She is following podiatry and getting debridements.  Is on cipro for her toes.  Patient feeling tired.  She is driving but [facility] trip [facility] [employer] [facility] wears her out.  Patient having problems with frequent urination and difficulty voiding at times.  Urination seems to be worse at night.  Patient trips and stumbles even with cane.  Using walker in the house.  Patient has been able to visit friends and go out to lunch.  She is unsteady on her feet and has difficulty ambulating to her mailbox which is across from a busy road.    REVIEW OF SYSTEM      Review of Systems   Respiratory:  Negative for chest tightness and shortness of breath.    Cardiovascular:  Negative for chest pain.         REVIEWED INFORMATION      Allergies   Allergen Reactions    Black [place] Pollen Allergy Skin Test Other (See Comments)    Cephalexin Other (See Comments)     Depression      Flavoxate Hcl     Sulfa Antibiotics     Sulphamethoxydiazine Hives    Tizanidine     Penicillins Swelling and Rash       Current Outpatient Medications   Medication Sig Dispense Refill    ciprofloxacin (CIPRO) 500 MG tablet Take 0.5 tablets by mouth 2 times daily      LORazepam (ATIVAN) 1 MG tablet Take 1 tablet by mouth every 8 hours as needed for Anxiety for up to 90 days. Max Daily Amount: 3 mg 90 tablet 2    oxyCODONE-acetaminophen (PERCOCET) 7.5-325 MG per tablet Take 1 tablet by mouth every 4 hours as needed for Pain for up to 30 days. Max Daily Amount: 6 tablets 180 tablet 0

## 2024-05-15 ENCOUNTER — OFFICE VISIT (OUTPATIENT)
Age: 84
End: 2024-05-15
Payer: MEDICARE

## 2024-05-15 VITALS — BODY MASS INDEX: 32.57 KG/M2 | WEIGHT: 177 LBS | HEIGHT: 62 IN

## 2024-05-15 DIAGNOSIS — M25.561 RIGHT KNEE PAIN, UNSPECIFIED CHRONICITY: ICD-10-CM

## 2024-05-15 DIAGNOSIS — M25.562 LEFT KNEE PAIN, UNSPECIFIED CHRONICITY: Primary | ICD-10-CM

## 2024-05-15 PROCEDURE — 99214 OFFICE O/P EST MOD 30 MIN: CPT | Performed by: ORTHOPAEDIC SURGERY

## 2024-05-15 PROCEDURE — 1123F ACP DISCUSS/DSCN MKR DOCD: CPT | Performed by: ORTHOPAEDIC SURGERY

## 2024-05-15 PROCEDURE — 20610 DRAIN/INJ JOINT/BURSA W/O US: CPT | Performed by: ORTHOPAEDIC SURGERY

## 2024-05-15 RX ADMIN — LIDOCAINE HYDROCHLORIDE 2 ML: 10 INJECTION, SOLUTION INFILTRATION; PERINEURAL at 15:40

## 2024-05-15 RX ADMIN — METHYLPREDNISOLONE ACETATE 80 MG: 80 INJECTION, SUSPENSION INTRA-ARTICULAR; INTRALESIONAL; INTRAMUSCULAR; SOFT TISSUE at 15:41

## 2024-05-15 NOTE — PROGRESS NOTES
Problem Relation Age of Onset    Colon Cancer Father     Cancer Father           Provider Attestation:  I Carin Dewey PA-C, personally performed the services described in this documentation. All medical record entries made by the scribe were at my direction and in my presence. I have reviewed the chart and discharge instructions and agree that the records reflect my personal performance and is accurate and complete. on 5/15/2024 at 2:05 PM    Please note that this chart was generated using voice recognition Dragon dictation software. Although every effort was made to ensure the accuracy of this automated transcription, some errors in transcription may have occurred.

## 2024-05-16 RX ORDER — LIDOCAINE HYDROCHLORIDE 10 MG/ML
2 INJECTION, SOLUTION INFILTRATION; PERINEURAL ONCE
Status: COMPLETED | OUTPATIENT
Start: 2024-05-16 | End: 2024-05-15

## 2024-05-16 RX ORDER — METHYLPREDNISOLONE ACETATE 80 MG/ML
80 INJECTION, SUSPENSION INTRA-ARTICULAR; INTRALESIONAL; INTRAMUSCULAR; SOFT TISSUE ONCE
Status: COMPLETED | OUTPATIENT
Start: 2024-05-16 | End: 2024-05-15

## 2024-05-28 ENCOUNTER — TELEPHONE (OUTPATIENT)
Age: 84
End: 2024-05-28

## 2024-05-28 NOTE — TELEPHONE ENCOUNTER
Rachelle East is calling to request a refill on the following medication(s):    Medication Request:  Requested Prescriptions     Pending Prescriptions Disp Refills    metFORMIN (GLUCOPHAGE) 1000 MG tablet 180 tablet 0     Sig: Take 1 tablet by mouth in the morning and at bedtime       Last Visit Date (If Applicable):  5/13/2024    Next Visit Date:    9/16/2024

## 2024-06-24 DIAGNOSIS — G62.9 SMALL FIBER NEUROPATHY: Primary | ICD-10-CM

## 2024-06-24 RX ORDER — OXYCODONE AND ACETAMINOPHEN 7.5; 325 MG/1; MG/1
1 TABLET ORAL EVERY 4 HOURS PRN
COMMUNITY
End: 2024-06-24 | Stop reason: SDUPTHER

## 2024-06-24 RX ORDER — OXYCODONE AND ACETAMINOPHEN 7.5; 325 MG/1; MG/1
1 TABLET ORAL EVERY 4 HOURS PRN
Qty: 180 TABLET | Refills: 0 | Status: SHIPPED | OUTPATIENT
Start: 2024-06-24 | End: 2024-07-24

## 2024-06-24 NOTE — TELEPHONE ENCOUNTER
Rachelle East is calling to request a refill on the following medication(s):    Medication Request:  Requested Prescriptions     Pending Prescriptions Disp Refills    oxyCODONE-acetaminophen (PERCOCET) 7.5-325 MG per tablet       Sig: Take 1 tablet by mouth every 4 hours as needed for Pain. Max Daily Amount: 6 tablets       Last Visit Date (If Applicable):  5/13/2024    Next Visit Date:    9/16/2024

## 2024-08-02 DIAGNOSIS — G62.9 SMALL FIBER NEUROPATHY: Primary | ICD-10-CM

## 2024-08-02 RX ORDER — OXYCODONE AND ACETAMINOPHEN 7.5; 325 MG/1; MG/1
1 TABLET ORAL EVERY 4 HOURS PRN
Qty: 180 TABLET | Refills: 0 | Status: SHIPPED | OUTPATIENT
Start: 2024-08-02 | End: 2024-09-01

## 2024-08-02 RX ORDER — OXYCODONE AND ACETAMINOPHEN 7.5; 325 MG/1; MG/1
1 TABLET ORAL EVERY 4 HOURS PRN
COMMUNITY
End: 2024-08-02 | Stop reason: SDUPTHER

## 2024-08-12 RX ORDER — CARVEDILOL 12.5 MG/1
TABLET ORAL
Qty: 180 TABLET | Refills: 3 | Status: SHIPPED | OUTPATIENT
Start: 2024-08-12

## 2024-08-12 NOTE — TELEPHONE ENCOUNTER
Rachelle East is calling to request a refill on the following medication(s):    Medication Request:  Requested Prescriptions     Pending Prescriptions Disp Refills    carvedilol (COREG) 12.5 MG tablet [Pharmacy Med Name: CARVEDILOL 12.5 MG TABLET] 180 tablet      Sig: TAKE ONE TABLET BY MOUTH TWICE A DAY WITH FOOD       Last Visit Date (If Applicable):  5/13/2024    Next Visit Date:    9/16/2024

## 2024-09-12 LAB
BASOPHILS ABSOLUTE: 0.03 K/UL (ref 0–0.2)
BASOPHILS RELATIVE PERCENT: 0.6 %
EOSINOPHILS ABSOLUTE: 0.22 K/UL (ref 0–0.5)
EOSINOPHILS RELATIVE PERCENT: 4.3 %
ESTIMATED AVERAGE GLUCOSE: 111 MG/DL
HBA1C MFR BLD: 5.5 % (ref 4.2–5.6)
HCT VFR BLD CALC: 33.2 % (ref 34–45)
HEMOGLOBIN: 10.9 G/DL (ref 11.5–15.5)
IMMATURE GRANS (ABS): 0.01
IMMATURE GRANULOCYTES %: 0.2 %
LYMPHOCYTES ABSOLUTE: 1.14 K/UL (ref 1–4)
LYMPHOCYTES RELATIVE PERCENT: 22.1 %
MCH RBC QN AUTO: 30.2 PG (ref 25–33)
MCHC RBC AUTO-ENTMCNC: 32.8 G/DL (ref 31–36)
MCV RBC AUTO: 92 FL (ref 80–99)
MONOCYTES ABSOLUTE: 0.41 K/UL (ref 0.2–1)
MONOCYTES RELATIVE PERCENT: 7.9 %
NEUTROPHILS ABSOLUTE: 3.36 K/UL (ref 1.5–7.5)
NEUTROPHILS RELATIVE PERCENT: 64.9 %
PDW BLD-RTO: 14.5 % (ref 11.5–15)
PLATELET # BLD: 183 K/UL (ref 130–400)
PMV BLD AUTO: 10.6 FL (ref 9.3–13)
RBC # BLD: 3.61 M/UL (ref 3.8–5.4)
WBC # BLD: 5.2 K/UL (ref 3.5–11)

## 2024-09-13 DIAGNOSIS — F41.9 ANXIETY: Primary | ICD-10-CM

## 2024-09-13 DIAGNOSIS — M48.061 SPINAL STENOSIS, LUMBAR REGION, WITHOUT NEUROGENIC CLAUDICATION: ICD-10-CM

## 2024-09-13 LAB
ALBUMIN: 4.2 G/DL (ref 3.5–5.2)
ALK PHOSPHATASE: 69 U/L (ref 40–142)
ALT SERPL-CCNC: 14 U/L (ref 5–40)
ANION GAP SERPL CALCULATED.3IONS-SCNC: 10 MEQ/L (ref 7–16)
AST SERPL-CCNC: 16 U/L (ref 9–40)
BILIRUB SERPL-MCNC: 0.3 MG/DL
BUN BLDV-MCNC: 16 MG/DL (ref 8–23)
CALCIUM SERPL-MCNC: 9.4 MG/DL (ref 8.5–10.5)
CHLORIDE BLD-SCNC: 105 MEQ/L (ref 95–107)
CHOLESTEROL, TOTAL: 172 MG/DL
CHOLESTEROL/HDL RATIO: 3.1 RATIO
CO2: 27 MEQ/L (ref 19–31)
CREAT SERPL-MCNC: 1.35 MG/DL (ref 0.6–1.3)
EGFR IF NONAFRICAN AMERICAN: 39 ML/MIN/1.73
GLUCOSE: 105 MG/DL (ref 70–99)
HDLC SERPL-MCNC: 56 MG/DL
LDL CHOLESTEROL: 92 MG/DL
LDL/HDL RATIO: 1.6 RATIO
POTASSIUM SERPL-SCNC: 4.8 MEQ/L (ref 3.5–5.4)
SODIUM BLD-SCNC: 142 MEQ/L (ref 133–146)
TOTAL PROTEIN: 6.1 G/DL (ref 6.1–8.3)
TRIGL SERPL-MCNC: 121 MG/DL
URIC ACID: 7.2 MG/DL (ref 2.7–6.1)
VLDLC SERPL CALC-MCNC: 24 MG/DL

## 2024-09-13 RX ORDER — LORAZEPAM 1 MG/1
1 TABLET ORAL EVERY 6 HOURS PRN
COMMUNITY
End: 2024-09-13 | Stop reason: SDUPTHER

## 2024-09-13 RX ORDER — OXYCODONE AND ACETAMINOPHEN 7.5; 325 MG/1; MG/1
1 TABLET ORAL EVERY 4 HOURS PRN
COMMUNITY
End: 2024-09-13 | Stop reason: SDUPTHER

## 2024-09-13 RX ORDER — LORAZEPAM 1 MG/1
1 TABLET ORAL EVERY 6 HOURS PRN
Qty: 90 TABLET | Refills: 0 | Status: SHIPPED | OUTPATIENT
Start: 2024-09-13 | End: 2024-10-13

## 2024-09-13 RX ORDER — OXYCODONE AND ACETAMINOPHEN 7.5; 325 MG/1; MG/1
1 TABLET ORAL EVERY 4 HOURS PRN
Qty: 180 TABLET | Refills: 0 | Status: SHIPPED | OUTPATIENT
Start: 2024-09-13 | End: 2024-10-13

## 2024-09-16 ENCOUNTER — OFFICE VISIT (OUTPATIENT)
Age: 84
End: 2024-09-16
Payer: MEDICARE

## 2024-09-16 VITALS
OXYGEN SATURATION: 96 % | SYSTOLIC BLOOD PRESSURE: 96 MMHG | BODY MASS INDEX: 32.39 KG/M2 | RESPIRATION RATE: 12 BRPM | WEIGHT: 176 LBS | DIASTOLIC BLOOD PRESSURE: 60 MMHG | HEART RATE: 74 BPM | HEIGHT: 62 IN

## 2024-09-16 DIAGNOSIS — R26.81 GAIT INSTABILITY: ICD-10-CM

## 2024-09-16 DIAGNOSIS — I10 PRIMARY HYPERTENSION: Primary | ICD-10-CM

## 2024-09-16 DIAGNOSIS — G62.9 SMALL FIBER NEUROPATHY: ICD-10-CM

## 2024-09-16 DIAGNOSIS — E11.69 TYPE 2 DIABETES MELLITUS WITH OTHER SPECIFIED COMPLICATION, WITHOUT LONG-TERM CURRENT USE OF INSULIN (HCC): ICD-10-CM

## 2024-09-16 PROCEDURE — G0008 ADMIN INFLUENZA VIRUS VAC: HCPCS | Performed by: FAMILY MEDICINE

## 2024-09-16 PROCEDURE — 3078F DIAST BP <80 MM HG: CPT | Performed by: FAMILY MEDICINE

## 2024-09-16 PROCEDURE — 3044F HG A1C LEVEL LT 7.0%: CPT | Performed by: FAMILY MEDICINE

## 2024-09-16 PROCEDURE — 3074F SYST BP LT 130 MM HG: CPT | Performed by: FAMILY MEDICINE

## 2024-09-16 PROCEDURE — 1123F ACP DISCUSS/DSCN MKR DOCD: CPT | Performed by: FAMILY MEDICINE

## 2024-09-16 PROCEDURE — 99214 OFFICE O/P EST MOD 30 MIN: CPT | Performed by: FAMILY MEDICINE

## 2024-09-16 PROCEDURE — 90653 IIV ADJUVANT VACCINE IM: CPT | Performed by: FAMILY MEDICINE

## 2024-09-16 SDOH — ECONOMIC STABILITY: FOOD INSECURITY: WITHIN THE PAST 12 MONTHS, THE FOOD YOU BOUGHT JUST DIDN'T LAST AND YOU DIDN'T HAVE MONEY TO GET MORE.: NEVER TRUE

## 2024-09-16 SDOH — ECONOMIC STABILITY: INCOME INSECURITY: HOW HARD IS IT FOR YOU TO PAY FOR THE VERY BASICS LIKE FOOD, HOUSING, MEDICAL CARE, AND HEATING?: NOT HARD AT ALL

## 2024-09-16 SDOH — ECONOMIC STABILITY: FOOD INSECURITY: WITHIN THE PAST 12 MONTHS, YOU WORRIED THAT YOUR FOOD WOULD RUN OUT BEFORE YOU GOT MONEY TO BUY MORE.: NEVER TRUE

## 2024-09-16 ASSESSMENT — ENCOUNTER SYMPTOMS
CHEST TIGHTNESS: 0
SHORTNESS OF BREATH: 0

## 2024-09-17 ENCOUNTER — PATIENT MESSAGE (OUTPATIENT)
Age: 84
End: 2024-09-17

## 2024-10-01 RX ORDER — LEVOTHYROXINE SODIUM 100 UG/1
100 TABLET ORAL DAILY
Qty: 90 TABLET | Refills: 2 | Status: SHIPPED | OUTPATIENT
Start: 2024-10-01

## 2024-10-01 NOTE — TELEPHONE ENCOUNTER
Rachelel East is calling to request a refill on the following medication(s):    Medication Request:  Requested Prescriptions     Pending Prescriptions Disp Refills    levothyroxine (SYNTHROID) 100 MCG tablet 90 tablet 2     Sig: Take 1 tablet by mouth daily       Last Visit Date (If Applicable):  9/16/2024    Next Visit Date:    12/27/2024

## 2024-10-07 RX ORDER — BUPROPION HYDROCHLORIDE 300 MG/1
300 TABLET ORAL EVERY MORNING
Qty: 30 TABLET | Refills: 5 | Status: SHIPPED | OUTPATIENT
Start: 2024-10-07

## 2024-10-07 NOTE — TELEPHONE ENCOUNTER
Rachelle East is calling to request a refill on the following medication(s):    Medication Request:  Requested Prescriptions     Pending Prescriptions Disp Refills    buPROPion (WELLBUTRIN XL) 300 MG extended release tablet [Pharmacy Med Name: buPROPion HCL  MG TABLET] 30 tablet 5     Sig: TAKE ONE TABLET BY MOUTH EVERY MORNING       Last Visit Date (If Applicable):  9/16/2024    Next Visit Date:    12/27/2024

## 2024-10-20 RX ORDER — METHYLPREDNISOLONE 4 MG
TABLET, DOSE PACK ORAL
Qty: 1 KIT | Refills: 0 | Status: SHIPPED | OUTPATIENT
Start: 2024-10-20

## 2024-10-20 RX ORDER — AZITHROMYCIN 500 MG/1
500 TABLET, FILM COATED ORAL DAILY
Qty: 5 TABLET | Refills: 0 | Status: SHIPPED | OUTPATIENT
Start: 2024-10-20 | End: 2024-10-25

## 2024-10-24 DIAGNOSIS — G62.9 SMALL FIBER NEUROPATHY: Primary | ICD-10-CM

## 2024-10-24 DIAGNOSIS — F41.9 ANXIETY: ICD-10-CM

## 2024-10-24 RX ORDER — OXYCODONE AND ACETAMINOPHEN 7.5; 325 MG/1; MG/1
1 TABLET ORAL EVERY 4 HOURS PRN
Qty: 180 TABLET | Refills: 0 | Status: SHIPPED | OUTPATIENT
Start: 2024-10-24 | End: 2024-11-23

## 2024-10-24 RX ORDER — LORAZEPAM 1 MG/1
1 TABLET ORAL EVERY 6 HOURS PRN
Qty: 90 TABLET | Refills: 1 | Status: SHIPPED | OUTPATIENT
Start: 2024-10-24 | End: 2024-12-23

## 2024-10-24 RX ORDER — OXYCODONE AND ACETAMINOPHEN 7.5; 325 MG/1; MG/1
1 TABLET ORAL EVERY 4 HOURS PRN
COMMUNITY
End: 2024-10-24 | Stop reason: SDUPTHER

## 2024-10-24 RX ORDER — LORAZEPAM 1 MG/1
1 TABLET ORAL EVERY 6 HOURS PRN
COMMUNITY
End: 2024-10-24 | Stop reason: SDUPTHER

## 2024-10-24 NOTE — TELEPHONE ENCOUNTER
Patient also that she has a terrible cough and has taken a zpack and still not feeling better can she get some cough syrup       Rachelle East is calling to request a refill on the following medication(s):    Medication Request:  Requested Prescriptions     Pending Prescriptions Disp Refills    oxyCODONE-acetaminophen (PERCOCET) 7.5-325 MG per tablet  0     Sig: Take 1 tablet by mouth every 4 hours as needed for Pain for up to 30 days. Max Daily Amount: 6 tablets    LORazepam (ATIVAN) 1 MG tablet  0     Sig: Take 1 tablet by mouth every 6 hours as needed for Anxiety for up to 30 days. Max Daily Amount: 4 mg       Last Visit Date (If Applicable):  9/16/2024    Next Visit Date:    12/27/2024

## 2024-11-04 ENCOUNTER — TELEPHONE (OUTPATIENT)
Age: 84
End: 2024-11-04

## 2024-11-04 RX ORDER — DOXYCYCLINE HYCLATE 100 MG
100 TABLET ORAL 2 TIMES DAILY
Qty: 20 TABLET | Refills: 0 | Status: SHIPPED | OUTPATIENT
Start: 2024-11-04 | End: 2024-11-14

## 2024-11-04 NOTE — TELEPHONE ENCOUNTER
Advise patient that script for doxycycline sent to her pharmacy which should cover her urine and URI.  If she is not improving by middle of the week would want to see her in office-

## 2024-11-04 NOTE — TELEPHONE ENCOUNTER
1.Couple week ago patient called over the weekend while she was out of town and we sent in Steroids and ATB - patient is still complaining of deep cough and laringitis.  No sore throat    2. Patient started with bad UTI over the weekend. Tried taking OTC Azo - but that is not touching the symptoms at all    3.Patient had toe surgery last week. Last night her foot got caught in the blankets when she got up to go to bathroom, tripped and fell and hit her head. She has hemotoma and says that she has history of falls    Patient hoping that something can be called in for her.  With her flying home and having surgery and now the uti - she is feeling miserable    Task marked High priority because she is feeling bad    Please call patient with what doctor recomends

## 2024-11-06 ENCOUNTER — TELEPHONE (OUTPATIENT)
Age: 84
End: 2024-11-06

## 2024-11-06 DIAGNOSIS — N39.0 RECURRENT UTI: ICD-10-CM

## 2024-11-06 DIAGNOSIS — E53.8 VITAMIN B12 DEFICIENCY: Primary | ICD-10-CM

## 2024-11-06 NOTE — TELEPHONE ENCOUNTER
I do not have openings. I would have her make appt with NB Friday.I'm putting in urine order for her to get done today, hopefully culture back soon, will help with treatment decision on ATB for Friday appt. Confirm what atb she is currently on

## 2024-11-06 NOTE — TELEPHONE ENCOUNTER
Rachelle called to say her UTI symptoms are worsening.  Started taking AZO again to deal with the pain and burning.  She said she doesn't have a fever.   She requested an appt with Dr Campbell only, but didn't want to wait until Fri.    She voiced hesitation that other providers are not familiar w/her hx, but was agreeable to sending note to Dr ALISIA Sue since she most recently prescribed meds for her.    She said she was prescribed a steroid and atb when in [place] 2 wks ago for her URI,  Then prescribed a 2nd round of antibiotics for her UTI that started Sat morning, began them this Mon (2 days ago), but the pain and burning is getting worse.      Her cough is a deep, bringing up cream color stuff, said she isn't sure if coming up or down.  It was green a few days ago.

## 2024-11-06 NOTE — TELEPHONE ENCOUNTER
Advise patient to get the urine test and keep APPT Friday- we need culture results to guide our treatment

## 2024-11-07 ENCOUNTER — HOSPITAL ENCOUNTER (OUTPATIENT)
Age: 84
Discharge: HOME | End: 2024-11-07
Payer: MEDICARE

## 2024-11-07 DIAGNOSIS — N39.0 RECURRENT UTI: ICD-10-CM

## 2024-11-07 LAB
BACTERIA URNS QL MICRO: ABNORMAL
CRYSTALS URNS MICRO: ABNORMAL /HPF
CRYSTALS URNS MICRO: ABNORMAL /HPF
EPI CELLS #/AREA URNS HPF: ABNORMAL /HPF (ref 0–5)
RBC #/AREA URNS HPF: ABNORMAL /HPF (ref 0–2)
WBC #/AREA URNS HPF: ABNORMAL /HPF (ref 0–5)

## 2024-11-07 PROCEDURE — 87088 URINE BACTERIA CULTURE: CPT

## 2024-11-07 PROCEDURE — 87186 SC STD MICRODIL/AGAR DIL: CPT

## 2024-11-07 PROCEDURE — 81015 MICROSCOPIC EXAM OF URINE: CPT

## 2024-11-07 PROCEDURE — 87086 URINE CULTURE/COLONY COUNT: CPT

## 2024-11-08 ENCOUNTER — OFFICE VISIT (OUTPATIENT)
Age: 84
End: 2024-11-08

## 2024-11-08 VITALS
OXYGEN SATURATION: 94 % | BODY MASS INDEX: 31.65 KG/M2 | TEMPERATURE: 98.2 F | HEIGHT: 62 IN | HEART RATE: 85 BPM | SYSTOLIC BLOOD PRESSURE: 102 MMHG | WEIGHT: 172 LBS | DIASTOLIC BLOOD PRESSURE: 64 MMHG

## 2024-11-08 DIAGNOSIS — N39.0 URINARY TRACT INFECTION DUE TO PROTEUS: Primary | ICD-10-CM

## 2024-11-08 DIAGNOSIS — B96.4 URINARY TRACT INFECTION DUE TO PROTEUS: Primary | ICD-10-CM

## 2024-11-08 DIAGNOSIS — J20.9 BRONCHOSPASM WITH BRONCHITIS, ACUTE: ICD-10-CM

## 2024-11-08 RX ORDER — CIPROFLOXACIN 500 MG/1
500 TABLET, FILM COATED ORAL 2 TIMES DAILY
Qty: 20 TABLET | Refills: 0 | Status: SHIPPED | OUTPATIENT
Start: 2024-11-08 | End: 2024-11-18

## 2024-11-08 RX ORDER — PHENAZOPYRIDINE HYDROCHLORIDE 200 MG/1
200 TABLET, FILM COATED ORAL 3 TIMES DAILY PRN
Qty: 6 TABLET | Refills: 0 | Status: SHIPPED | OUTPATIENT
Start: 2024-11-08 | End: 2024-11-11

## 2024-11-08 NOTE — PROGRESS NOTES
MORNING 30 tablet 5    levothyroxine (SYNTHROID) 100 MCG tablet Take 1 tablet by mouth daily 90 tablet 2    carvedilol (COREG) 12.5 MG tablet TAKE ONE TABLET BY MOUTH TWICE A DAY WITH FOOD 180 tablet 3    metFORMIN (GLUCOPHAGE) 1000 MG tablet Take 1 tablet by mouth in the morning and at bedtime 180 tablet 1    oxyBUTYnin (DITROPAN) 5 MG tablet TAKE 1 TABLET BY MOUTH TWICE A DAY FOR BLADDER SPASM 60 tablet 5    isosorbide mononitrate (IMDUR) 30 MG extended release tablet TAKE 1 TABLET BY MOUTH EVERY MORNING 90 tablet 3    citalopram (CELEXA) 40 MG tablet Take 1 tablet by mouth daily 90 tablet 3    vitamin B-12 (CYANOCOBALAMIN) 1000 MCG tablet Take 1 tablet by mouth daily      vitamin D 25 MCG (1000 UT) CAPS Take 2 capsules by mouth daily      EPINEPHrine (EPIPEN) 0.3 MG/0.3ML SOAJ injection Inject 0.3 mLs into the muscle once as needed      famotidine (PEPCID) 20 MG tablet Take 1 tablet by mouth 2 times daily      fexofenadine (ALLEGRA) 180 MG tablet Take 1 tablet by mouth daily 4 tabs daily      omeprazole (PRILOSEC) 20 MG delayed release capsule Take 1 capsule by mouth      methylPREDNISolone (MEDROL DOSEPACK) 4 MG tablet Take by mouth.With food as directed on pack 1 kit 0     No current facility-administered medications for this visit.        Patient Active Problem List   Diagnosis    Angioedema    Anxiety    Dysuria    Falls    Geographic tongue    GERD (gastroesophageal reflux disease)    Gout    Hyperlipidemia    Hypertension    Hypothyroidism    Lumbar radiculopathy    Monoclonal gammopathy of unknown significance (MGUS)    Neuropathy    Obesity (BMI 30-39.9)    Obstructive sleep apnea syndrome    Osteoarthritis of knee    Recurrent major depression in remission ([facility])    Slurred speech    Small fiber neuropathy    Spinal stenosis    T2DM (type 2 diabetes mellitus) ([facility])    Transient ischemic attack    Trigeminal neuralgia       Past Medical History:   Diagnosis Date    Closed fracture of right radius     Colon

## 2024-11-09 LAB
MICROORGANISM SPEC CULT: ABNORMAL
SERVICE CMNT-IMP: ABNORMAL
SPECIMEN DESCRIPTION: ABNORMAL

## 2024-11-11 ENCOUNTER — HOSPITAL ENCOUNTER (OUTPATIENT)
Dept: GENERAL RADIOLOGY | Age: 84
Discharge: HOME | End: 2024-11-13
Payer: MEDICARE

## 2024-11-11 ENCOUNTER — TELEPHONE (OUTPATIENT)
Age: 84
End: 2024-11-11

## 2024-11-11 ENCOUNTER — HOSPITAL ENCOUNTER (OUTPATIENT)
Age: 84
Discharge: HOME | End: 2024-11-13
Payer: MEDICARE

## 2024-11-11 DIAGNOSIS — M25.552 ACUTE HIP PAIN, LEFT: ICD-10-CM

## 2024-11-11 DIAGNOSIS — S32.020G CLOSED COMPRESSION FRACTURE OF L2 LUMBAR VERTEBRA WITH DELAYED HEALING, SUBSEQUENT ENCOUNTER: ICD-10-CM

## 2024-11-11 DIAGNOSIS — M48.061 SPINAL STENOSIS, LUMBAR REGION, WITHOUT NEUROGENIC CLAUDICATION: ICD-10-CM

## 2024-11-11 DIAGNOSIS — R31.9 HEMATURIA, UNSPECIFIED TYPE: ICD-10-CM

## 2024-11-11 DIAGNOSIS — M48.061 SPINAL STENOSIS, LUMBAR REGION, WITHOUT NEUROGENIC CLAUDICATION: Primary | ICD-10-CM

## 2024-11-11 PROCEDURE — 73502 X-RAY EXAM HIP UNI 2-3 VIEWS: CPT

## 2024-11-11 PROCEDURE — 72100 X-RAY EXAM L-S SPINE 2/3 VWS: CPT

## 2024-11-11 RX ORDER — CYCLOBENZAPRINE HCL 5 MG
5 TABLET ORAL 3 TIMES DAILY PRN
Qty: 30 TABLET | Refills: 0 | Status: SHIPPED | OUTPATIENT
Start: 2024-11-11 | End: 2024-11-21

## 2024-11-11 ASSESSMENT — ENCOUNTER SYMPTOMS
SHORTNESS OF BREATH: 0
CHEST TIGHTNESS: 0

## 2024-11-11 NOTE — TELEPHONE ENCOUNTER
Patient is calling because she is felling little better but she is having pain in her side, she states that she doesn't seem to be her kidney. Patient states that she did have a fall last week and could have injured it. She said its almost in tolerable. Please advise

## 2024-11-11 NOTE — TELEPHONE ENCOUNTER
See how patient is currently taking her percocet.  Order for lumbar x-ray placed in chart to rule out compression fracture  Script for flexeril muscle relaxant sent to her pharmacy to take in addition to her percocet; get x-ray and update tomorrow with how she is feeling.

## 2024-11-11 NOTE — TELEPHONE ENCOUNTER
Patient is taking them 4 times a day.    Patient will go get xray done today in [place]. She will let us know tomorrow how she is doing.

## 2024-11-12 ENCOUNTER — OFFICE VISIT (OUTPATIENT)
Age: 84
End: 2024-11-12

## 2024-11-12 VITALS
DIASTOLIC BLOOD PRESSURE: 68 MMHG | BODY MASS INDEX: 31.65 KG/M2 | HEART RATE: 74 BPM | SYSTOLIC BLOOD PRESSURE: 110 MMHG | OXYGEN SATURATION: 95 % | WEIGHT: 172 LBS | HEIGHT: 62 IN

## 2024-11-12 DIAGNOSIS — R30.0 DYSURIA: ICD-10-CM

## 2024-11-12 DIAGNOSIS — M79.18 PIRIFORMIS MUSCLE PAIN: Primary | ICD-10-CM

## 2024-11-12 LAB
BILIRUBIN, POC: NEGATIVE
BLOOD URINE, POC: NEGATIVE
CLARITY, POC: CLEAR
COLOR, POC: NORMAL
GLUCOSE URINE, POC: NEGATIVE MG/DL
KETONES, POC: NEGATIVE MG/DL
LEUKOCYTE EST, POC: NEGATIVE
NITRITE, POC: POSITIVE
PH, POC: 6
PROTEIN, POC: NEGATIVE MG/DL
SPECIFIC GRAVITY, POC: >1.03
UROBILINOGEN, POC: 0.2 MG/DL

## 2024-11-12 RX ORDER — METHYLPREDNISOLONE ACETATE 80 MG/ML
80 INJECTION, SUSPENSION INTRA-ARTICULAR; INTRALESIONAL; INTRAMUSCULAR; SOFT TISSUE ONCE
Status: COMPLETED | OUTPATIENT
Start: 2024-11-12 | End: 2024-11-12

## 2024-11-12 RX ORDER — KETOROLAC TROMETHAMINE 30 MG/ML
30 INJECTION, SOLUTION INTRAMUSCULAR; INTRAVENOUS ONCE
Status: COMPLETED | OUTPATIENT
Start: 2024-11-12 | End: 2024-11-12

## 2024-11-12 RX ORDER — KETOROLAC TROMETHAMINE 30 MG/ML
60 INJECTION, SOLUTION INTRAMUSCULAR; INTRAVENOUS ONCE
Status: DISCONTINUED | OUTPATIENT
Start: 2024-11-12 | End: 2024-11-12

## 2024-11-12 RX ORDER — PHENAZOPYRIDINE HYDROCHLORIDE 200 MG/1
200 TABLET, FILM COATED ORAL 3 TIMES DAILY PRN
Qty: 9 TABLET | Refills: 0 | Status: SHIPPED | OUTPATIENT
Start: 2024-11-12 | End: 2024-11-15

## 2024-11-12 RX ADMIN — KETOROLAC TROMETHAMINE 30 MG: 30 INJECTION, SOLUTION INTRAMUSCULAR; INTRAVENOUS at 16:44

## 2024-11-12 RX ADMIN — METHYLPREDNISOLONE ACETATE 80 MG: 80 INJECTION, SUSPENSION INTRA-ARTICULAR; INTRALESIONAL; INTRAMUSCULAR; SOFT TISSUE at 16:44

## 2024-11-12 RX ADMIN — KETOROLAC TROMETHAMINE 30 MG: 30 INJECTION, SOLUTION INTRAMUSCULAR; INTRAVENOUS at 16:45

## 2024-11-13 ENCOUNTER — TELEPHONE (OUTPATIENT)
Age: 84
End: 2024-11-13

## 2024-11-13 NOTE — TELEPHONE ENCOUNTER
Patient said that you requested that she call today with an update.    She said that her muscle problem is about 25% better and there has been no change with her urination problems.  She said that she told you that her urinary issues were \"starting up again\" yesterday ?

## 2024-11-14 ENCOUNTER — TELEPHONE (OUTPATIENT)
Age: 84
End: 2024-11-14

## 2024-11-14 NOTE — TELEPHONE ENCOUNTER
Patient got a referral to Dr. Smith and would like to know if that can be changed to Dr. Castano instead

## 2024-11-15 DIAGNOSIS — R30.0 DYSURIA: ICD-10-CM

## 2024-11-15 NOTE — TELEPHONE ENCOUNTER
Patient called to say she is is a established  patient with Dr Wooten.  She was able to make apt with him.  She is scheduled on Dec 2nd

## 2024-11-19 ENCOUNTER — TELEPHONE (OUTPATIENT)
Age: 84
End: 2024-11-19

## 2024-11-19 DIAGNOSIS — R31.9 HEMATURIA, UNSPECIFIED TYPE: Primary | ICD-10-CM

## 2024-11-19 NOTE — TELEPHONE ENCOUNTER
See previous task- someone was to call Dr. Wooten office yesterday to see if they perform kyphoplasty- I don't believe they do which is why I had referred her to a spine ortho surgeon

## 2024-11-19 NOTE — TELEPHONE ENCOUNTER
Patient states that you were giving her a referral to see a neuro surgeon and patient is requesting to know if it is ok for her to see Dr Villar for this since she has a past with him, she wanted to make sure it was ok with you and to make sure that you were sending her to alan diane for a specific reason. Please advise

## 2024-11-19 NOTE — PROGRESS NOTES
Procedure Laterality Date    BREAST BIOPSY      EYE SURGERY      FOREARM SURGERY Right 9/7/2023    RIGHT DISTAL RADIUS OPEN REDUCTION INTERNAL FIXATION WITH SYNTHES AND PRE-OP REGIONAL BLOCK performed by Kevin Castano MD at [facility] OR    JOINT REPLACEMENT      KNEE ARTHROPLASTY Left     LAMINECTOMY      TOE SURGERY      TONSILLECTOMY      WRIST FRACTURE SURGERY Right 09/07/2023    RIGHT DISTAL RADIUS OPEN REDUCTION INTERNAL FIXATION WITH SYNTHES AND PRE-OP REGIONAL BLOCK        Social History     Socioeconomic History    Marital status:      Spouse name: None    Number of children: None    Years of education: None    Highest education level: None   Tobacco Use    Smoking status: Never    Smokeless tobacco: Never   Vaping Use    Vaping status: Never Used   Substance and Sexual Activity    Alcohol use: Never    Drug use: Never     Social Determinants of Health     Financial Resource Strain: Low Risk  (9/16/2024)    Overall Financial Resource Strain (CARDIA)     Difficulty of Paying Living Expenses: Not hard at all   Food Insecurity: No Food Insecurity (9/16/2024)    Hunger Vital Sign     Worried About Running Out of Food in the Last Year: Never true     Ran Out of Food in the Last Year: Never true   Transportation Needs: Unknown (9/16/2024)    PRAPARE - Transportation     Lack of Transportation (Non-Medical): No   Physical Activity: Insufficiently Active (1/12/2024)    Exercise Vital Sign     Days of Exercise per Week: 2 days     Minutes of Exercise per Session: 20 min    Received from [facility], [facility]    [place] Safety & Environment   Housing Stability: Unknown (9/16/2024)    Housing Stability Vital Sign     Homeless in the Last Year: No        Family History   Problem Relation Age of Onset    Colon Cancer Father     Cancer Father         PHYSICAL EXAM     /68   Pulse 74   Ht 1.575 m (5' 2\")   Wt 78 kg (172 lb)   SpO2 95%   BMI 31.46 kg/m²    Physical

## 2024-11-20 ENCOUNTER — HOSPITAL ENCOUNTER (OUTPATIENT)
Dept: CT IMAGING | Age: 84
Discharge: HOME | End: 2024-11-22
Attending: FAMILY MEDICINE
Payer: MEDICARE

## 2024-11-20 DIAGNOSIS — R31.9 HEMATURIA, UNSPECIFIED TYPE: ICD-10-CM

## 2024-11-20 PROCEDURE — 74176 CT ABD & PELVIS W/O CONTRAST: CPT

## 2024-11-20 NOTE — TELEPHONE ENCOUNTER
Patient called Dr. Jamal Buck office and has an appointment scheduled for 12/2/2024 per previous task.

## 2024-11-21 ENCOUNTER — TELEPHONE (OUTPATIENT)
Age: 84
End: 2024-11-21

## 2024-11-21 DIAGNOSIS — R30.0 DYSURIA: Primary | ICD-10-CM

## 2024-11-21 NOTE — TELEPHONE ENCOUNTER
Patient called to say she thought she was supposed to have a repeat UA, no order in chart.     She completed the medication medication that turns urine orange.  She said she thinks the infection is coming back.  She asked if she could get an repeat UA order, will have done at a  facility.  Please advise - can call cell number if not at home.

## 2024-11-21 NOTE — TELEPHONE ENCOUNTER
Patient was notified.  She found out that Dr. Wooten doesn't do the procedure that Dr. Campbell thinks she needs so she is going to go back to Dr. Smith.

## 2024-11-21 NOTE — TELEPHONE ENCOUNTER
Order for urinalysis and culture in chart- notify patient- given her problems I would also like her to followup with a urologist- is there someone she prefers to see? I would recommend Dr. Gant in [place]

## 2024-11-25 NOTE — TELEPHONE ENCOUNTER
Rachelle East is calling to request a refill on the following medication(s):    Medication Request:  Requested Prescriptions     Pending Prescriptions Disp Refills    metFORMIN (GLUCOPHAGE) 1000 MG tablet [Pharmacy Med Name: METFORMIN HCL 1,000 MG TABLET] 180 tablet 1     Sig: TAKE 1 TABLET BY MOUTH 2 TIMES A DAY (MORNING AND AT BEDTIME)       Last Visit Date (If Applicable):  11/12/2024    Next Visit Date:    11/27/2024

## 2024-11-27 ENCOUNTER — OFFICE VISIT (OUTPATIENT)
Age: 84
End: 2024-11-27
Payer: MEDICARE

## 2024-11-27 VITALS
TEMPERATURE: 96.9 F | DIASTOLIC BLOOD PRESSURE: 64 MMHG | HEIGHT: 62 IN | WEIGHT: 166.8 LBS | HEART RATE: 76 BPM | BODY MASS INDEX: 30.69 KG/M2 | SYSTOLIC BLOOD PRESSURE: 102 MMHG | OXYGEN SATURATION: 96 %

## 2024-11-27 DIAGNOSIS — G62.9 SMALL FIBER NEUROPATHY: Primary | ICD-10-CM

## 2024-11-27 DIAGNOSIS — M48.061 SPINAL STENOSIS, LUMBAR REGION, WITHOUT NEUROGENIC CLAUDICATION: ICD-10-CM

## 2024-11-27 DIAGNOSIS — R26.81 GAIT INSTABILITY: ICD-10-CM

## 2024-11-27 DIAGNOSIS — S32.020G CLOSED COMPRESSION FRACTURE OF L2 LUMBAR VERTEBRA WITH DELAYED HEALING, SUBSEQUENT ENCOUNTER: ICD-10-CM

## 2024-11-27 PROCEDURE — 1159F MED LIST DOCD IN RCRD: CPT | Performed by: FAMILY MEDICINE

## 2024-11-27 PROCEDURE — 99214 OFFICE O/P EST MOD 30 MIN: CPT | Performed by: FAMILY MEDICINE

## 2024-11-27 PROCEDURE — 3078F DIAST BP <80 MM HG: CPT | Performed by: FAMILY MEDICINE

## 2024-11-27 PROCEDURE — 3074F SYST BP LT 130 MM HG: CPT | Performed by: FAMILY MEDICINE

## 2024-11-27 PROCEDURE — 1123F ACP DISCUSS/DSCN MKR DOCD: CPT | Performed by: FAMILY MEDICINE

## 2024-11-27 RX ORDER — CARVEDILOL 6.25 MG/1
6.25 TABLET ORAL 2 TIMES DAILY
Qty: 60 TABLET | Refills: 1
Start: 2024-11-27

## 2024-11-27 RX ORDER — OXYCODONE AND ACETAMINOPHEN 7.5; 325 MG/1; MG/1
1 TABLET ORAL EVERY 4 HOURS PRN
Qty: 180 TABLET | Refills: 0 | Status: SHIPPED | OUTPATIENT
Start: 2024-11-27 | End: 2024-12-27

## 2024-11-27 NOTE — PROGRESS NOTES
[facility]  [facility] MEDICINE  [address]  [facility] [address]  Dept: [phone]     Date of Visit:  2024  Patient Name: Rachelle East   Patient :  1940     CHIEF COMPLAINT/HPI:     Rachelle East is a 84 y.o. female who presents today for an general visit to be evaluated for the following condition(s):  Chief Complaint   Patient presents with    Follow-up     Re-evaluation of kidney and back issues, clarify plans moving forward.     Patient back pain is better.  X-ray showed possible new compression fracture.  She has APPT with Dr. Wooten next week.  Patient states she feels like she has intermittent bladder spasms.  Patient has been feeling better since taking cipro.  Patient has had some falls.  REVIEW OF SYSTEM      Review of Systems   Respiratory:  Negative for chest tightness and shortness of breath.    Cardiovascular:  Negative for chest pain.         REVIEWED INFORMATION      Allergies   Allergen Reactions    Black [place] Pollen Allergy Skin Test Other (See Comments)    Cephalexin Other (See Comments)     Depression      Flavoxate Hcl     Sulfa Antibiotics     Sulphamethoxydiazine Hives    Tizanidine     Penicillins Swelling and Rash       Current Outpatient Medications   Medication Sig Dispense Refill    carvedilol (COREG) 6.25 MG tablet Take 1 tablet by mouth 2 times daily 60 tablet 1    oxyCODONE-acetaminophen (PERCOCET) 7.5-325 MG per tablet Take 1 tablet by mouth every 4 hours as needed for Pain for up to 30 days. Intended supply: 30 days Max Daily Amount: 6 tablets 180 tablet 0    metFORMIN (GLUCOPHAGE) 1000 MG tablet TAKE 1 TABLET BY MOUTH 2 TIMES A DAY (MORNING AND AT BEDTIME) 180 tablet 1    Multiple Vitamin (MULTIVITAMIN ADULT PO) Take 1 tablet by mouth daily Mature Multivitamin      LORazepam (ATIVAN) 1 MG tablet Take 1 tablet by mouth every 6 hours as needed for Anxiety for up to 60 days. Max Daily Amount: 4 mg 90 tablet 1

## 2024-12-02 ENCOUNTER — OFFICE VISIT (OUTPATIENT)
Age: 84
End: 2024-12-02
Payer: MEDICARE

## 2024-12-02 VITALS
HEART RATE: 72 BPM | HEIGHT: 62 IN | RESPIRATION RATE: 15 BRPM | WEIGHT: 166 LBS | SYSTOLIC BLOOD PRESSURE: 132 MMHG | BODY MASS INDEX: 30.55 KG/M2 | DIASTOLIC BLOOD PRESSURE: 80 MMHG

## 2024-12-02 DIAGNOSIS — S32.020A CLOSED COMPRESSION FRACTURE OF L2 VERTEBRA, INITIAL ENCOUNTER (HCC): Primary | ICD-10-CM

## 2024-12-02 DIAGNOSIS — M54.50 LUMBAR PAIN: ICD-10-CM

## 2024-12-02 PROCEDURE — 99214 OFFICE O/P EST MOD 30 MIN: CPT | Performed by: PHYSICIAN ASSISTANT

## 2024-12-02 PROCEDURE — 3075F SYST BP GE 130 - 139MM HG: CPT | Performed by: PHYSICIAN ASSISTANT

## 2024-12-02 PROCEDURE — 1160F RVW MEDS BY RX/DR IN RCRD: CPT | Performed by: PHYSICIAN ASSISTANT

## 2024-12-02 PROCEDURE — 1123F ACP DISCUSS/DSCN MKR DOCD: CPT | Performed by: PHYSICIAN ASSISTANT

## 2024-12-02 PROCEDURE — 1159F MED LIST DOCD IN RCRD: CPT | Performed by: PHYSICIAN ASSISTANT

## 2024-12-02 PROCEDURE — 3079F DIAST BP 80-89 MM HG: CPT | Performed by: PHYSICIAN ASSISTANT

## 2024-12-02 ASSESSMENT — ENCOUNTER SYMPTOMS
CHEST TIGHTNESS: 0
SHORTNESS OF BREATH: 0

## 2024-12-02 NOTE — PROGRESS NOTES
[facility], [facility] NEUROSCIENCE [place], [facility] NEUROSURGERY  [address]  [place] [id number]  Dept: [phone]  Dept Fax: [phone]     Patient:  Rachelle East  YOB: 1940  Date: 12/2/24      Chief Complaint   Patient presents with    Follow-up     Lumbar stenosis, [email or web address] 11/11/2024           HPI:     I had the pleasure of seeing this patient in the office today for primary complaints of back pain.  As you know she is an 84-year-old female who is undergone lower back surgery in the past in the form of an L3-L5 decompression approximately 3-1/2 years ago.  Patient was doing fine until November 4 of this year when she fell onto her backside.  Since that time she has been having pain diffusely over the lower back.  She denies pain or paresthesias in the lower extremities.  Physical examination today demonstrates good strength and sensation throughout bilateral lower extremities.  Gait is slow but steady with the assistance of a rolling walker.  Mild to moderately positive Calvin's testing on the left, negative on the right.  She has a degree of tenderness to palpation over the sacroiliac regions bilaterally lower lumbar region.  Lumbar x-rays performed recently demonstrate a new mild compression of the L2 vertebral body with depression of the superior endplate which is new compared to previous MRI and x-rays from July 2023.  Stable T12 and L1 compression fractures.  Patient is an 84-year-old female who presents today with approximately 1 month history of back pain following a fall from standing height.  Physical examination demonstrates tenderness diffusely over the lower lumbar region and sacroiliac regions bilaterally.  Good strength all bilateral lower extremities, slightly positive Calvin's testing on the left.  At this point in time given her recent history of fall as well as findings of compression fracture on

## 2024-12-12 ENCOUNTER — HOSPITAL ENCOUNTER (OUTPATIENT)
Dept: MRI IMAGING | Age: 84
Discharge: HOME | End: 2024-12-14
Payer: MEDICARE

## 2024-12-12 DIAGNOSIS — S32.020A CLOSED COMPRESSION FRACTURE OF L2 VERTEBRA, INITIAL ENCOUNTER (HCC): ICD-10-CM

## 2024-12-12 PROCEDURE — 72148 MRI LUMBAR SPINE W/O DYE: CPT

## 2024-12-16 ENCOUNTER — OFFICE VISIT (OUTPATIENT)
Age: 84
End: 2024-12-16
Payer: MEDICARE

## 2024-12-16 VITALS
HEIGHT: 62 IN | DIASTOLIC BLOOD PRESSURE: 72 MMHG | SYSTOLIC BLOOD PRESSURE: 118 MMHG | RESPIRATION RATE: 17 BRPM | BODY MASS INDEX: 30.55 KG/M2 | WEIGHT: 166 LBS | HEART RATE: 64 BPM

## 2024-12-16 DIAGNOSIS — S32.020A CLOSED COMPRESSION FRACTURE OF L2 VERTEBRA, INITIAL ENCOUNTER (HCC): Primary | ICD-10-CM

## 2024-12-16 DIAGNOSIS — M54.50 LUMBAR PAIN: ICD-10-CM

## 2024-12-16 PROCEDURE — 99214 OFFICE O/P EST MOD 30 MIN: CPT | Performed by: PHYSICIAN ASSISTANT

## 2024-12-16 PROCEDURE — 1159F MED LIST DOCD IN RCRD: CPT | Performed by: PHYSICIAN ASSISTANT

## 2024-12-16 PROCEDURE — 3074F SYST BP LT 130 MM HG: CPT | Performed by: PHYSICIAN ASSISTANT

## 2024-12-16 PROCEDURE — 3078F DIAST BP <80 MM HG: CPT | Performed by: PHYSICIAN ASSISTANT

## 2024-12-16 PROCEDURE — 1123F ACP DISCUSS/DSCN MKR DOCD: CPT | Performed by: PHYSICIAN ASSISTANT

## 2024-12-16 PROCEDURE — 1160F RVW MEDS BY RX/DR IN RCRD: CPT | Performed by: PHYSICIAN ASSISTANT

## 2024-12-16 NOTE — PROGRESS NOTES
[facility], [facility] NEUROS[facility]ENCE [facility], [facility] NEUROSURGERY  [address], [address]  [place] [id number]  Dept: [phone]  Dept Fax: [phone]     Patient:  Rachelle East  YOB: 1940  Date: 12/16/24      Chief Complaint   Patient presents with    Follow-up     Lumbar- follow up, [email or web address] 12/12/2024           HPI:     I had the pleasure of seeing this patient in the office today in follow-up.  As you know she is an 84-year-old female who was last seen in our office approximately 1 week ago with lower back pain.  Patient states she fell on November 4 onto her backside and since this time has been having diffuse lower back pain.  Her pain has improved slightly compared to the fall 1-1/2 months ago.  She is describing pain diffusely throughout the lower back denies pain or paresthesias into the lower extremities.  Physical examination demonstrates moderate tenderness to palpation diffusely over the lower lumbar/sacral region.  Well-healed midline incision.  Good strength throughout bilateral lower extremities.  Gait is slow but steady with the assistance of a rolling walker.  Lumbar MRI demonstrates a new compression fracture at the L2 vertebral body with 20% loss of height.  No significant retropulsion noted. moderate central stenosis is seen at the L2-3 level.  Old compression fractures are seen at the T12 and L1 level.  Marked degenerative disc disease seen throughout the lumbar spine, as well as previous laminectomy from the L3-L5 levels.  Treatment options discussed with the patient in the office today included conservative treatment with bracing and physical therapy versus kyphoplasty at the L2 level.  After review of all the above as well as any remaining questions answered, the patient and her family clearly wished to maintain a conservative stance thus we will order her a lumbar back brace.  We did discuss the usage of her

## 2024-12-18 ENCOUNTER — NURSE ONLY (OUTPATIENT)
Age: 84
End: 2024-12-18

## 2024-12-18 NOTE — PROGRESS NOTES
Patient seen today in office by surgery scheduler and RN. Fitted with LSO brace. Patient educated on application of LSO brace and when/how to wear it-VU per pt.     4 wk f/u appt with XR scheduled for 1/20/25 @1120 confirmed with pt.

## 2024-12-27 ENCOUNTER — OFFICE VISIT (OUTPATIENT)
Age: 84
End: 2024-12-27
Payer: MEDICARE

## 2024-12-27 VITALS
TEMPERATURE: 97.9 F | WEIGHT: 166.2 LBS | BODY MASS INDEX: 30.59 KG/M2 | HEIGHT: 62 IN | DIASTOLIC BLOOD PRESSURE: 80 MMHG | HEART RATE: 88 BPM | OXYGEN SATURATION: 96 % | SYSTOLIC BLOOD PRESSURE: 120 MMHG

## 2024-12-27 DIAGNOSIS — E53.8 VITAMIN B12 DEFICIENCY: Primary | ICD-10-CM

## 2024-12-27 DIAGNOSIS — I10 PRIMARY HYPERTENSION: ICD-10-CM

## 2024-12-27 DIAGNOSIS — E03.9 HYPOTHYROIDISM, UNSPECIFIED TYPE: ICD-10-CM

## 2024-12-27 DIAGNOSIS — G62.9 SMALL FIBER NEUROPATHY: ICD-10-CM

## 2024-12-27 DIAGNOSIS — E11.69 TYPE 2 DIABETES MELLITUS WITH OTHER SPECIFIED COMPLICATION, WITHOUT LONG-TERM CURRENT USE OF INSULIN (HCC): ICD-10-CM

## 2024-12-27 PROCEDURE — 3074F SYST BP LT 130 MM HG: CPT | Performed by: FAMILY MEDICINE

## 2024-12-27 PROCEDURE — 3079F DIAST BP 80-89 MM HG: CPT | Performed by: FAMILY MEDICINE

## 2024-12-27 PROCEDURE — 99214 OFFICE O/P EST MOD 30 MIN: CPT | Performed by: FAMILY MEDICINE

## 2024-12-27 PROCEDURE — 1159F MED LIST DOCD IN RCRD: CPT | Performed by: FAMILY MEDICINE

## 2024-12-27 PROCEDURE — 3044F HG A1C LEVEL LT 7.0%: CPT | Performed by: FAMILY MEDICINE

## 2024-12-27 PROCEDURE — 1123F ACP DISCUSS/DSCN MKR DOCD: CPT | Performed by: FAMILY MEDICINE

## 2024-12-27 RX ORDER — LORAZEPAM 1 MG/1
1 TABLET ORAL EVERY 6 HOURS PRN
COMMUNITY

## 2024-12-27 RX ORDER — CARVEDILOL 6.25 MG/1
6.25 TABLET ORAL 2 TIMES DAILY WITH MEALS
COMMUNITY

## 2024-12-27 RX ORDER — ACETAMINOPHEN 160 MG
2 TABLET,DISINTEGRATING ORAL DAILY
COMMUNITY

## 2024-12-27 ASSESSMENT — ENCOUNTER SYMPTOMS
CHEST TIGHTNESS: 0
SHORTNESS OF BREATH: 0

## 2024-12-27 NOTE — PROGRESS NOTES
[facility]  [facility] MEDICINE  [address]  [place] [facility] [address]  Dept: [phone]     Date of Visit:  2024  Patient Name: Rachelle East   Patient :  1940     CHIEF COMPLAINT/HPI:     Rachelle East is a 84 y.o. female who presents today for an general visit to be evaluated for the following condition(s):  Chief Complaint   Patient presents with    Hypertension     She is here for her regular check up without labs.    Patient has compression Fracture L2.  Patient has f/u with [facility] end of January.  Patient considering aqua therapy/PT but still with significant pain.  Her feet are getting worsening neuropathy.  She is having recurrent falls.  Patient uses rollator and cane around house.  She is stumbling a lot.  Patient feels like her speech is off since having recurrent falls.  Bladder seems improved with the medication.  Some discomfort in the morning that improves after day goes on.    REVIEW OF SYSTEM      Review of Systems   Respiratory:  Negative for chest tightness and shortness of breath.    Cardiovascular:  Negative for chest pain.         REVIEWED INFORMATION      Allergies   Allergen Reactions    Black [place] Pollen Allergy Skin Test Other (See Comments)    Cephalexin Other (See Comments)     Depression      Flavoxate Hcl     Sulfa Antibiotics     Sulphamethoxydiazine Hives    Tizanidine     Penicillins Swelling and Rash       Current Outpatient Medications   Medication Sig Dispense Refill    LORazepam (ATIVAN) 1 MG tablet Take 1 tablet by mouth every 6 hours as needed for Anxiety. Max Daily Amount: 4 mg      carvedilol (COREG) 6.25 MG tablet Take 1 tablet by mouth 2 times daily (with meals)      vitamin D (VITAMIN D3) 50 MCG (2000) CAPS capsule Take 2 capsules by mouth daily      metFORMIN (GLUCOPHAGE) 1000 MG tablet TAKE 1 TABLET BY MOUTH 2 TIMES A DAY (MORNING AND AT BEDTIME) 180 tablet 1    Multiple Vitamin (MULTIVITAMIN ADULT

## 2025-01-03 DIAGNOSIS — G62.9 SMALL FIBER NEUROPATHY: Primary | ICD-10-CM

## 2025-01-03 RX ORDER — OXYCODONE AND ACETAMINOPHEN 7.5; 325 MG/1; MG/1
1 TABLET ORAL EVERY 4 HOURS PRN
COMMUNITY
End: 2025-01-03 | Stop reason: SDUPTHER

## 2025-01-03 NOTE — TELEPHONE ENCOUNTER
Rachelle East is calling to request a refill on the following medication(s):    Medication Request:  Requested Prescriptions     Pending Prescriptions Disp Refills    oxyCODONE-acetaminophen (PERCOCET) 7.5-325 MG per tablet 180 tablet 0     Sig: Take 1 tablet by mouth every 4 hours as needed for Pain for up to 30 days. Max Daily Amount: 6 tablets       Last Visit Date (If Applicable):  12/27/2024    Next Visit Date:    1/22/2025

## 2025-01-06 RX ORDER — OXYBUTYNIN CHLORIDE 5 MG/1
TABLET ORAL
Qty: 60 TABLET | Refills: 5 | Status: SHIPPED | OUTPATIENT
Start: 2025-01-06

## 2025-01-06 RX ORDER — OXYCODONE AND ACETAMINOPHEN 7.5; 325 MG/1; MG/1
1 TABLET ORAL EVERY 4 HOURS PRN
Qty: 180 TABLET | Refills: 0 | Status: SHIPPED | OUTPATIENT
Start: 2025-01-06 | End: 2025-02-05

## 2025-01-06 NOTE — TELEPHONE ENCOUNTER
Patient called on Friday to have refilled - [facility] doesn't have     Patient will now be out - asking for refill to be sent again today

## 2025-01-06 NOTE — TELEPHONE ENCOUNTER
Rachelle East is calling to request a refill on the following medication(s):    Medication Request:  Requested Prescriptions     Pending Prescriptions Disp Refills    oxyBUTYnin (DITROPAN) 5 MG tablet [Pharmacy Med Name: oxyBUTYnin  5 MG TABLET] 60 tablet 5     Sig: TAKE 1 TABLET BY MOUTH 2 TIMES A DAY FOR BLADDER SPASM       Last Visit Date (If Applicable):  12/27/2024    Next Visit Date:    1/22/2025

## 2025-01-20 ENCOUNTER — HOSPITAL ENCOUNTER (OUTPATIENT)
Age: 85
Discharge: HOME | End: 2025-01-22
Payer: MEDICARE

## 2025-01-20 ENCOUNTER — OFFICE VISIT (OUTPATIENT)
Age: 85
End: 2025-01-20
Payer: MEDICARE

## 2025-01-20 VITALS
HEIGHT: 62 IN | HEART RATE: 86 BPM | BODY MASS INDEX: 30.55 KG/M2 | DIASTOLIC BLOOD PRESSURE: 67 MMHG | SYSTOLIC BLOOD PRESSURE: 111 MMHG | WEIGHT: 166 LBS

## 2025-01-20 DIAGNOSIS — S32.020A CLOSED COMPRESSION FRACTURE OF L2 VERTEBRA, INITIAL ENCOUNTER (HCC): ICD-10-CM

## 2025-01-20 DIAGNOSIS — S32.020D COMPRESSION FRACTURE OF L2 VERTEBRA WITH ROUTINE HEALING, SUBSEQUENT ENCOUNTER: Primary | ICD-10-CM

## 2025-01-20 DIAGNOSIS — M54.50 LUMBAR PAIN: ICD-10-CM

## 2025-01-20 PROCEDURE — 1159F MED LIST DOCD IN RCRD: CPT | Performed by: NEUROLOGICAL SURGERY

## 2025-01-20 PROCEDURE — 3074F SYST BP LT 130 MM HG: CPT | Performed by: NEUROLOGICAL SURGERY

## 2025-01-20 PROCEDURE — 72100 X-RAY EXAM L-S SPINE 2/3 VWS: CPT

## 2025-01-20 PROCEDURE — 1123F ACP DISCUSS/DSCN MKR DOCD: CPT | Performed by: NEUROLOGICAL SURGERY

## 2025-01-20 PROCEDURE — 3078F DIAST BP <80 MM HG: CPT | Performed by: NEUROLOGICAL SURGERY

## 2025-01-20 PROCEDURE — 99214 OFFICE O/P EST MOD 30 MIN: CPT | Performed by: NEUROLOGICAL SURGERY

## 2025-01-20 NOTE — PROGRESS NOTES
[facility] NEUROSCIENCE INSTITUTE, [facility] NEUROSURGERY  [address], [address]  [place] [id number]  Dept: [phone]  Dept Fax: [phone]     Patient:  Rachelle East  YOB: 1940  Date: 1/20/25      Chief Complaint   Patient presents with    Follow-up     4 wk follow up after PT and Brace           HPI:     I had the pleasure of seeing this patient back in the office today in follow-up.  As you know she is a 84-year-old female who suffered an L2 compression fracture for which she has been treated with a back brace.  She returns to my office today doing well.  She has been wearing her back brace each day.  Overall she indicates that her level of back discomfort has improved at least 50%.  She has no other new complaints at today's visit.    Examination today demonstrates age-appropriate strength and sensation in both lower extremities.  Gait is steady.    Follow-up lumbar x-rays are reviewed.  These x-rays demonstrate the L2 compression fracture which is unchanged from her previous lumbar x-rays.  I did review these images in the office today with the patient and her family.  At this point time we Argun to proceed with physical therapy.  She will continue to wear her back brace is much as possible during the daytime hours.  I am planning on seeing her back in the office in 2 months for her next follow-up visit.  I took this opportunity to answer intervening questions she and her family may have had.  I did invite her to feel free to contact me should she have further problems or questions which I could be of assistance with in the interim.        Physical Exam:      /67   Pulse 86   Ht 1.575 m (5' 2\")   Wt 75.3 kg (166 lb)   BMI 30.36 kg/m²         Assessment and Plan:     1. Compression fracture of L2 vertebra with routine healing, subsequent encounter              Electronically signed by Jamal Wooten MD on 1/20/2025

## 2025-01-24 LAB
ALBUMIN: 4.2 G/DL
ALP BLD-CCNC: 55 U/L
ALT SERPL-CCNC: 13 U/L
ANION GAP SERPL CALCULATED.3IONS-SCNC: 9 MMOL/L
AST SERPL-CCNC: 15 U/L
BASOPHILS ABSOLUTE: 0.03 /ΜL
BASOPHILS RELATIVE PERCENT: 0.5 %
BILIRUB SERPL-MCNC: 0.4 MG/DL (ref 0.1–1.4)
BUN BLDV-MCNC: 20 MG/DL
CALCIUM SERPL-MCNC: 9.1 MG/DL
CHLORIDE BLD-SCNC: 103 MMOL/L
CHOLESTEROL, TOTAL: 169 MG/DL
CHOLESTEROL/HDL RATIO: 3.3
CO2: 31 MMOL/L
CREAT SERPL-MCNC: 1.18 MG/DL
EOSINOPHILS ABSOLUTE: 0.24 /ΜL
EOSINOPHILS RELATIVE PERCENT: 4.4 %
ESTIMATED AVERAGE GLUCOSE: 114
GFR, ESTIMATED: 46.1
GLUCOSE BLD-MCNC: 103 MG/DL
HBA1C MFR BLD: 5.6 %
HCT VFR BLD CALC: 34.7 % (ref 36–46)
HDLC SERPL-MCNC: 51 MG/DL (ref 35–70)
HEMOGLOBIN: 11.2 G/DL (ref 12–16)
LDL CHOLESTEROL: 93
LYMPHOCYTES ABSOLUTE: 1.19 /ΜL
LYMPHOCYTES RELATIVE PERCENT: 21.8 %
MCH RBC QN AUTO: 30.9 PG
MCHC RBC AUTO-ENTMCNC: 32.3 G/DL
MCV RBC AUTO: 95.9 FL
MONOCYTES ABSOLUTE: 0.05 /ΜL
MONOCYTES RELATIVE PERCENT: 9.7 %
NEUTROPHILS ABSOLUTE: 3.46 /ΜL
NEUTROPHILS RELATIVE PERCENT: 63.2 %
NONHDLC SERPL-MCNC: 118 MG/DL
PDW BLD-RTO: 3.62 %
PLATELET # BLD: 175 K/ΜL
PMV BLD AUTO: ABNORMAL FL
POTASSIUM SERPL-SCNC: 4.2 MMOL/L
RBC # BLD: 3.62 10^6/ΜL
SODIUM BLD-SCNC: 139 MMOL/L
TOTAL PROTEIN: 6.4 G/DL (ref 6.4–8.2)
TRIGL SERPL-MCNC: 125 MG/DL
TSH SERPL DL<=0.05 MIU/L-ACNC: 0.43 UIU/ML
VITAMIN B-12: 477
VLDLC SERPL CALC-MCNC: 25 MG/DL
WBC # BLD: 5.47 10^3/ML

## 2025-01-27 DIAGNOSIS — G62.9 SMALL FIBER NEUROPATHY: ICD-10-CM

## 2025-01-27 DIAGNOSIS — E03.9 HYPOTHYROIDISM, UNSPECIFIED TYPE: ICD-10-CM

## 2025-01-27 DIAGNOSIS — E53.8 VITAMIN B12 DEFICIENCY: ICD-10-CM

## 2025-01-27 DIAGNOSIS — E11.69 TYPE 2 DIABETES MELLITUS WITH OTHER SPECIFIED COMPLICATION, WITHOUT LONG-TERM CURRENT USE OF INSULIN (HCC): ICD-10-CM

## 2025-01-27 DIAGNOSIS — I10 PRIMARY HYPERTENSION: ICD-10-CM

## 2025-01-28 ENCOUNTER — HOSPITAL ENCOUNTER (OUTPATIENT)
Age: 85
Setting detail: THERAPIES SERIES
Discharge: HOME | End: 2025-01-28
Attending: NEUROLOGICAL SURGERY
Payer: MEDICARE

## 2025-01-28 ENCOUNTER — OFFICE VISIT (OUTPATIENT)
Age: 85
End: 2025-01-28
Payer: MEDICARE

## 2025-01-28 VITALS
BODY MASS INDEX: 29.48 KG/M2 | DIASTOLIC BLOOD PRESSURE: 86 MMHG | WEIGHT: 161.2 LBS | SYSTOLIC BLOOD PRESSURE: 126 MMHG | TEMPERATURE: 97.7 F | OXYGEN SATURATION: 95 % | HEART RATE: 94 BPM

## 2025-01-28 DIAGNOSIS — Z91.81 AT HIGH RISK FOR FALLS: ICD-10-CM

## 2025-01-28 DIAGNOSIS — F41.9 ANXIETY: Primary | ICD-10-CM

## 2025-01-28 DIAGNOSIS — G62.9 SMALL FIBER NEUROPATHY: ICD-10-CM

## 2025-01-28 DIAGNOSIS — S32.020G CLOSED COMPRESSION FRACTURE OF L2 LUMBAR VERTEBRA WITH DELAYED HEALING, SUBSEQUENT ENCOUNTER: ICD-10-CM

## 2025-01-28 DIAGNOSIS — E11.69 TYPE 2 DIABETES MELLITUS WITH OTHER SPECIFIED COMPLICATION, WITHOUT LONG-TERM CURRENT USE OF INSULIN (HCC): ICD-10-CM

## 2025-01-28 PROCEDURE — 3074F SYST BP LT 130 MM HG: CPT | Performed by: FAMILY MEDICINE

## 2025-01-28 PROCEDURE — 99214 OFFICE O/P EST MOD 30 MIN: CPT | Performed by: FAMILY MEDICINE

## 2025-01-28 PROCEDURE — 3079F DIAST BP 80-89 MM HG: CPT | Performed by: FAMILY MEDICINE

## 2025-01-28 PROCEDURE — 1159F MED LIST DOCD IN RCRD: CPT | Performed by: FAMILY MEDICINE

## 2025-01-28 PROCEDURE — 97162 PT EVAL MOD COMPLEX 30 MIN: CPT

## 2025-01-28 PROCEDURE — 3044F HG A1C LEVEL LT 7.0%: CPT | Performed by: FAMILY MEDICINE

## 2025-01-28 PROCEDURE — 1123F ACP DISCUSS/DSCN MKR DOCD: CPT | Performed by: FAMILY MEDICINE

## 2025-01-28 RX ORDER — LORAZEPAM 1 MG/1
1 TABLET ORAL EVERY 6 HOURS PRN
Qty: 120 TABLET | Refills: 2 | Status: SHIPPED | OUTPATIENT
Start: 2025-01-28 | End: 2025-04-28

## 2025-01-28 SDOH — ECONOMIC STABILITY: FOOD INSECURITY: WITHIN THE PAST 12 MONTHS, THE FOOD YOU BOUGHT JUST DIDN'T LAST AND YOU DIDN'T HAVE MONEY TO GET MORE.: NEVER TRUE

## 2025-01-28 SDOH — ECONOMIC STABILITY: FOOD INSECURITY: WITHIN THE PAST 12 MONTHS, YOU WORRIED THAT YOUR FOOD WOULD RUN OUT BEFORE YOU GOT MONEY TO BUY MORE.: NEVER TRUE

## 2025-01-28 ASSESSMENT — PATIENT HEALTH QUESTIONNAIRE - PHQ9
5. POOR APPETITE OR OVEREATING: NOT AT ALL
7. TROUBLE CONCENTRATING ON THINGS, SUCH AS READING THE NEWSPAPER OR WATCHING TELEVISION: NOT AT ALL
9. THOUGHTS THAT YOU WOULD BE BETTER OFF DEAD, OR OF HURTING YOURSELF: NOT AT ALL
3. TROUBLE FALLING OR STAYING ASLEEP: NOT AT ALL
8. MOVING OR SPEAKING SO SLOWLY THAT OTHER PEOPLE COULD HAVE NOTICED. OR THE OPPOSITE, BEING SO FIGETY OR RESTLESS THAT YOU HAVE BEEN MOVING AROUND A LOT MORE THAN USUAL: NOT AT ALL
SUM OF ALL RESPONSES TO PHQ9 QUESTIONS 1 & 2: 0
6. FEELING BAD ABOUT YOURSELF - OR THAT YOU ARE A FAILURE OR HAVE LET YOURSELF OR YOUR FAMILY DOWN: NOT AT ALL
10. IF YOU CHECKED OFF ANY PROBLEMS, HOW DIFFICULT HAVE THESE PROBLEMS MADE IT FOR YOU TO DO YOUR WORK, TAKE CARE OF THINGS AT HOME, OR GET ALONG WITH OTHER PEOPLE: NOT DIFFICULT AT ALL
1. LITTLE INTEREST OR PLEASURE IN DOING THINGS: NOT AT ALL
SUM OF ALL RESPONSES TO PHQ QUESTIONS 1-9: 0
2. FEELING DOWN, DEPRESSED OR HOPELESS: NOT AT ALL
4. FEELING TIRED OR HAVING LITTLE ENERGY: NOT AT ALL
SUM OF ALL RESPONSES TO PHQ QUESTIONS 1-9: 0

## 2025-01-28 ASSESSMENT — ENCOUNTER SYMPTOMS
SHORTNESS OF BREATH: 0
CHEST TIGHTNESS: 0

## 2025-01-28 NOTE — CONSULTS
[] [facility]  Outpatient Rehabilitation &  Therapy  [address]  [phone]  [phone] [] [facility]  Outpatient Rehabilitation &  Therapy  [address]  P: [phone]  F: [phone] [] [facility]  Outpatient Rehabilitation &  Therapy  [address]  P: [phone]  F: [phone] [] [facility]  Outpatient Rehabilitation &  Therapy  [address]  [phone]  [phone] [] [facility]  Outpatient Rehabilitation &  Therapy  [address]   P: [phone]  F: [phone]  [] [facility]  Outpatient Rehabilitation &  Therapy  [address]  P: [phone]  F: [phone] [x] [facility]  Outpatient Rehabilitation &  Therapy  [address]  P: [phone]  F: [phone] [] [facility]  Outpatient Rehabilitation &  Therapy  [address]  P: [phone]  F: [phone] [] [facility]  Outpatient Rehabilitation &  Therapy  [address]  P: [phone]  F: [phone]  [] [facility] Outpatient Rehabilitation &  Therapy  [address]  P: [phone]  F: [phone]     Physical Therapy General Evaluation    Date:  2025  Patient: Rachelle East  : 1940  MRN: [id number]  Physician: Jamal Wooten MD      Insurance: [employer] ;   BMN,  No Hard Max,  AUTH after eval with [facility]  Medical Diagnosis: S32.020D (ICD-10-CM) - Compression fracture of L2 vertebra with routine healing, subsequent encounter     Rehab Codes: R26.81,  M54.50,  M25.651,  M25.652,  M62.551,  M62.552.   Onset Date: 24                                 Next 's appt: 3/31/25    Subjective:   26  fell on her backside and found to have compression fx of L2.   Had some pain in left hip initially.  Soon developed

## 2025-01-28 NOTE — PROGRESS NOTES
Rachelle East (:  1940) is a 84 y.o. female, Established patient, here for evaluation of the following chief complaint(s):  1 Month Follow-Up (Patient fell on 24. Has been wearing brace went back to neurosurgeon and they decided just to do physical therapy. Has first appt with PT later today. )         Assessment & Plan  1. Back pain.  Her back pain is likely due to a compression fracture, which can take up to 12 weeks to heal. She is approximately 12 weeks post-compression fracture. The pain is described as diffuse and sometimes exacerbated by standing and walking. She has been wearing a brace daily and has started physical therapy. She is advised to continue physical therapy to improve muscle strength and balance. She is also advised to use pain medication as needed, particularly at night when the pain is worst.    2. Neuropathy.  Her neuropathy is a significant issue, affecting her balance and causing cold sensations in her feet at night. Her blood flow is robust, indicating that the issue is not related to circulation. Physical therapy can help with muscle strength and proprioception, although there is a limited ceiling on improvement. She is advised to maintain her strength and coordination and to use vision aids, such as turning on lights at night, to prevent falls.    3. Weight loss.  She has lost 51 pounds intentionally and is currently at a stable weight of 161 pounds. Previous scans, including CT of the head, chest, abdomen, pelvis, and neck, showed no signs of cancer. Her calcium levels are not elevated, and there are no bony lesions on MRI. She is no longer classified as obese but is still considered overweight. She is advised to maintain her current weight and not to lose more than 10-15 pounds, as further weight loss could necessitate additional discussions.    4. Medication management.  She requested a refill for Percocet. She has been using it primarily at night to manage pain. She

## 2025-01-30 ENCOUNTER — PATIENT MESSAGE (OUTPATIENT)
Age: 85
End: 2025-01-30

## 2025-01-30 ENCOUNTER — HOSPITAL ENCOUNTER (OUTPATIENT)
Age: 85
Setting detail: THERAPIES SERIES
Discharge: HOME | End: 2025-01-30
Attending: NEUROLOGICAL SURGERY
Payer: MEDICARE

## 2025-01-30 PROCEDURE — 97110 THERAPEUTIC EXERCISES: CPT

## 2025-01-30 NOTE — FLOWSHEET NOTE
[] [facility]  Outpatient Rehabilitation &  Therapy  [address]  [phone]  [phone] [] [facility]  Outpatient Rehabilitation &  Therapy  [address]  P: [phone]  F: [phone] [] [facility]  Outpatient Rehabilitation &  Therapy  [address]  P: [phone]  F: [phone] [] [facility]  Outpatient Rehabilitation &  Therapy  [address]  [phone]  [phone] [] [facility]  Outpatient Rehabilitation &  Therapy  [address]   P: [phone]  F: [phone]  [] [facility]  Outpatient Rehabilitation &  Therapy  [address]  P: [phone]  F: [phone] [x] [facility]  Outpatient Rehabilitation &  Therapy  [address]  P: [phone]  F: [phone] [] [facility]  Outpatient Rehabilitation &  Therapy  [address]  P: [phone]  F: [phone] [] [facility]  Outpatient Rehabilitation &  Therapy  [address]  P: [phone]  F: [phone]  [] [facility] Outpatient Rehabilitation &  Therapy  [address]  P: [phone]  F: [phone]     Physical Therapy Daily Treatment Note    Date:  2025  Patient Name:  Rachelle East    :  1940  MRN: [id number]  Physician: Jamal Wooten MD                                Insurance: [employer] [employer] ;   BMN,  No Hard Max,  AUTH after eval with Cohere  25  Approved 8 visits from 25-3/28/25  Medical Diagnosis: S32.020D (ICD-10-CM) - Compression fracture of L2 vertebra with routine healing, subsequent encounter                       Rehab Codes: R26.81,  M54.50,  M25.651,  M25.652,  M62.551,  M62.552.   Onset Date: 24                                 Next 's appt: 3/31/25     Visit# / total visits:   (

## 2025-01-31 RX ORDER — CITALOPRAM HYDROBROMIDE 40 MG/1
40 TABLET ORAL DAILY
Qty: 90 TABLET | Refills: 3 | Status: SHIPPED | OUTPATIENT
Start: 2025-01-31

## 2025-01-31 NOTE — TELEPHONE ENCOUNTER
Rachelle East is calling to request a refill on the following medication(s):    Medication Request:  Requested Prescriptions     Pending Prescriptions Disp Refills    citalopram (CELEXA) 40 MG tablet [Pharmacy Med Name: CITALOPRAM HBR 40 MG TABLET] 90 tablet 3     Sig: TAKE 1 TABLET BY MOUTH DAILY       Last Visit Date (If Applicable):  1/28/2025    Next Visit Date:    Visit date not found

## 2025-02-06 ENCOUNTER — HOSPITAL ENCOUNTER (OUTPATIENT)
Age: 85
Setting detail: THERAPIES SERIES
Discharge: HOME | End: 2025-02-06
Attending: NEUROLOGICAL SURGERY
Payer: MEDICARE

## 2025-02-06 PROCEDURE — 97110 THERAPEUTIC EXERCISES: CPT

## 2025-02-06 NOTE — FLOWSHEET NOTE
standing exercises to work on LE strength and balance.  Finished PT with exercises on mat table.  She denied increased pain after PT.  She did note fatigue during and after PT.  She also needed verbal cues to complete exercises today.    [] No change.   .     [] Other:  [x] Patient would continue to benefit from skilled physical therapy services due to recent fall and Injury to her low back  (L2 compression Fx).  She also has neuropathy of each lower leg which has given her trouble with walking and safe mobility.   She also presents with limited trunk and LE ROM and strength which is affecting her safety and mobility.  Skilled PT will work to improve her trunk and LE ROM and strength to help reduce her pain and improve safe mobility.       STG: (to be met in 10 treatments)  ? Pain:  Low back and hips to 5/10 at worst to facilitate walking in her home and standing tolerance  ? ROM:  trunk to at least 50% of normal so she can do ADLs with less back pain  ? Strength: bilateral hips to 2+/5 so pt can walk with less loss of balance  ? Function:  Pt to stand for 10 minutes before she must sit down due to pain  Patient to be independent with home exercise program as demonstrated by performance with correct form without cues.  Demonstrate Knowledge of fall prevention   Goal Met  1/28/25  LTG: (to be met in 24 treatments)  Improve Modified OSWESTRY score to 24% impaired allie pt can stand and walk with less pain for longer periods of time   Improve Tinetti score to at least 18/28 so pt can prepare a meal without loss of balance  Improve Tug TEST to 20 seconds so pt can stand for at least 15 minutes before she must sit due to pain  Improve LE ROM to at least 60 degrees of SLR, 5 degrees of hip extension and 15 degrees of hip IR to facilitate performing ADLs without pain.  Improve strength of bilateral hips to 3+/5, quads, hams, DF, and PF to at least 4+/5 so pt can walk in home so pt can meal prep without hip/back pain

## 2025-02-11 ENCOUNTER — HOSPITAL ENCOUNTER (OUTPATIENT)
Age: 85
Setting detail: THERAPIES SERIES
Discharge: HOME | End: 2025-02-11
Attending: NEUROLOGICAL SURGERY
Payer: MEDICARE

## 2025-02-11 PROCEDURE — 97110 THERAPEUTIC EXERCISES: CPT

## 2025-02-11 NOTE — FLOWSHEET NOTE
[] [facility]  Outpatient Rehabilitation &  Therapy  [address]  [phone]  [phone] [] [facility]  Outpatient Rehabilitation &  Therapy  [address]  P: [phone]  F: [phone] [] [facility]  Outpatient Rehabilitation &  Therapy  [address]  P: [phone]  F: [phone] [] [facility]  Outpatient Rehabilitation &  Therapy  [address]  [phone]  [phone] [] [facility]  Outpatient Rehabilitation &  Therapy  [address]   P: [phone]  F: [phone]  [] [facility]  Outpatient Rehabilitation &  Therapy  [address]  P: [phone]  F: [phone] [x] [facility]  Outpatient Rehabilitation &  Therapy  [address]  P: [phone]  F: [phone] [] [facility]  Outpatient Rehabilitation &  Therapy  [address]  P: [phone]  F: [phone] [] [facility]  Outpatient Rehabilitation &  Therapy  [address]  P: [phone]  F: [phone]  [] [facility] Outpatient Rehabilitation &  Therapy  [address]  P: [phone]  F: [phone]     Physical Therapy Daily Treatment Note    Date:  2025  Patient Name:  Rachelle East    :  1940  MRN: [id number]  Physician: Jamal Wooten MD                                Insurance: [place] [employer] ;   BMN,  No Hard Max,  AUTH after eval with Cohere  25  Approved 8 visits from 25-3/28/25  Medical Diagnosis: S32.020D (ICD-10-CM) - Compression fracture of L2 vertebra with routine healing, subsequent encounter                       Rehab Codes: R26.81,  M54.50,  M25.651,  M25.652,  M62.551,  M62.552.   Onset Date: 24                                 Next 's appt: 3/31/25     Visit# / total visits:   (

## 2025-02-13 ENCOUNTER — HOSPITAL ENCOUNTER (OUTPATIENT)
Age: 85
Setting detail: THERAPIES SERIES
Discharge: HOME | End: 2025-02-13
Attending: NEUROLOGICAL SURGERY
Payer: MEDICARE

## 2025-02-13 DIAGNOSIS — G62.9 SMALL FIBER NEUROPATHY: Primary | ICD-10-CM

## 2025-02-13 PROCEDURE — 97110 THERAPEUTIC EXERCISES: CPT

## 2025-02-13 RX ORDER — OXYCODONE AND ACETAMINOPHEN 7.5; 325 MG/1; MG/1
1 TABLET ORAL EVERY 4 HOURS PRN
COMMUNITY
End: 2025-02-13 | Stop reason: SDUPTHER

## 2025-02-13 RX ORDER — OXYCODONE AND ACETAMINOPHEN 7.5; 325 MG/1; MG/1
1 TABLET ORAL EVERY 4 HOURS PRN
Qty: 120 TABLET | Refills: 0 | Status: SHIPPED | OUTPATIENT
Start: 2025-02-13 | End: 2025-03-15

## 2025-02-13 NOTE — TELEPHONE ENCOUNTER
Rachelle East is calling to request a refill on the following medication(s):    Medication Request:  Requested Prescriptions     Pending Prescriptions Disp Refills    oxyCODONE-acetaminophen (PERCOCET) 7.5-325 MG per tablet 120 tablet 0     Sig: Take 1 tablet by mouth every 4 hours as needed for Pain for up to 30 days. Max Daily Amount: 6 tablets       Last Visit Date (If Applicable):  1/28/2025    Next Visit Date:    5/12/2025

## 2025-02-13 NOTE — FLOWSHEET NOTE
Approved);   Progress note for Medicare patient due at visit 10     Cancels/No Shows: 0/0    Subjective:    Pain:  [x] Yes  [] No Location: Low back Pain Rating: (0-10 scale) 2/10  Pain altered Tx:  [x] No  [] Yes  Action:  Comments:  Pt reports she is feeling better.  She reports her pain continues to improve and she is walking better.    Objective:  Walked into clinic with 3 wheeled walker and obvious antalgia  Modalities:   Precautions [] No  [x] Yes: Fall Risk,  L2 Compression Fracture  Exercises:  Exercise Reps/ Time Weight/ Level Comments   Nu Step  7' L3                                   MAT TABLE         Pelvic tilts  12  x      Increased reps 2/13/25   Hip Add Isometrics  2\" x 20     Increased reps 2/11/25   Bridge  12 x      Increased reps 2/13/25   LTR  10x    New 1/30/25   BKFO  10 x       Modified Valeriy Stretch  20\" x 3   New 1/30/25   Hamstring Stretch  15\" x 3   New 2/11   Side Ly Hip Abd  10 x     New 1/30/25   Clam Shells  15 x    Increased reps 2/6/25   Reverse Clamshells  10  x   New 2/6/25    Prone hip ext  10 x   New 2/6/25    Zan Piriformis stretch  15\" x 4   New 2/11/25   SLR 10 each  New 2/11/25                                       SEATED         LAQ  20 x  2#  Weight added 2/13/25 March  15 x 2#  Weight added 2/13/25   Ham curls  15 x orange  Increased reps 2/13/25   Heel/Toe Raises  15 x   Increased reps 2/13/25                                 STAND         Squats  10  x   New 2/6/25   Calf Stretch AT SM  15\" x 4   New  2/6/25   3 way hip  10 x ea   New 1/30/25   Step ups  10 x  6\" New 2/11/25                                 BALANCE          Stand feet together  30\" x 2    New 2/13/25    Stand on foam  30\" x 2    New 2/13/25                       Other:          Treatment Charges: Mins Units Time In/Out   []  Modalities        [x]  Ther Exercise  50   3 11:05/11:55   []  Neuromuscular Re-ed      []  Gait Training      []  Manual Therapy      []  Ther Activities      []  Aquatics      []  Lives with daughter.  No smoking, alcohol or illicit drug use.

## 2025-02-17 ENCOUNTER — TELEPHONE (OUTPATIENT)
Age: 85
End: 2025-02-17

## 2025-02-17 NOTE — TELEPHONE ENCOUNTER
Patient wants you to call her apt her percocet. She would not go into detail. Said she has been on this for a while. Said it was called in wrong and would not say anything further.      She was advised Dr GE not in office today and back on Tuesday.  She said she is ok with waiting.  She has physical therapy in late morning and would be home in afternoon

## 2025-02-18 ENCOUNTER — HOSPITAL ENCOUNTER (OUTPATIENT)
Age: 85
Setting detail: THERAPIES SERIES
Discharge: HOME | End: 2025-02-18
Attending: NEUROLOGICAL SURGERY
Payer: MEDICARE

## 2025-02-18 PROCEDURE — 97110 THERAPEUTIC EXERCISES: CPT

## 2025-02-18 NOTE — FLOWSHEET NOTE
[] [facility]  Outpatient Rehabilitation &  Therapy  [address]  [phone]  [phone] [] [facility]  Outpatient Rehabilitation &  Therapy  [address]  P: [phone]  F: [phone] [] [facility]  Outpatient Rehabilitation &  Therapy  [address]  P: [phone]  F: [phone] [] [facility]  Outpatient Rehabilitation &  Therapy  [address]  [phone]  [phone] [] [facility]  Outpatient Rehabilitation &  Therapy  [address]   P: [phone]  F: [phone]  [] [facility]  Outpatient Rehabilitation &  Therapy  [address]  P: [phone]  F: [phone] [x] [facility]  Outpatient Rehabilitation &  Therapy  [address]  P: [phone]  F: [phone] [] [facility]  Outpatient Rehabilitation &  Therapy  [address]  P: [phone]  F: [phone] [] [facility]  Outpatient Rehabilitation &  Therapy  [address]  P: [phone]  F: [phone]  [] [facility] Outpatient Rehabilitation &  Therapy  [address]  P: [phone]  F: [phone]     Physical Therapy Daily Treatment Note    Date:  2025  Patient Name:  Rachelle East    :  1940  MRN: [id number]  Physician: Jamal Wooten MD                                Insurance: [place] [employer] ;   BMN,  No Hard Max,  AUTH after eval with Cohere  25  Approved 8 visits from 25-3/28/25  Medical Diagnosis: S32.020D (ICD-10-CM) - Compression fracture of L2 vertebra with routine healing, subsequent encounter                       Rehab Codes: R26.81,  M54.50,  M25.651,  M25.652,  M62.551,  M62.552.   Onset Date: 24                                 Next 's appt: 3/31/25     Visit# / total visits:   (

## 2025-02-19 NOTE — TELEPHONE ENCOUNTER
Spoke with patient yesterday- last perccoet script was only written for 20 days- Advised her to call when she has 3-4 days left and we will send new 30 day script for 180 tabs

## 2025-02-20 ENCOUNTER — HOSPITAL ENCOUNTER (OUTPATIENT)
Age: 85
Setting detail: THERAPIES SERIES
Discharge: HOME | End: 2025-02-20
Attending: NEUROLOGICAL SURGERY
Payer: MEDICARE

## 2025-02-20 NOTE — FLOWSHEET NOTE
[] [facility]  Outpatient Rehabilitation &  Therapy  [address]  [phone]  [phone] [] [facility]  Outpatient Rehabilitation &  Therapy  [address]  P: [phone]  F: [phone] [] [facility]  Outpatient Rehabilitation &  Therapy  [address]  P: [phone]  F: [phone] [] [facility]  Outpatient Rehabilitation &  Therapy  [address]  [phone]  [phone] [] [facility]  Outpatient Rehabilitation &  Therapy  [address]   P: [phone]  F: [phone]  [] [facility]  Outpatient Rehabilitation &  Therapy  [address]  P: [phone]  F: [phone] [x] [facility]  Outpatient Rehabilitation &  Therapy  [address]  P: [phone]  F: [phone] [] [facility]  Outpatient Rehabilitation &  Therapy  [address]  P: [phone]  F: [phone] [] [facility]  Outpatient Rehabilitation &  Therapy  [address]  P: [phone]  F: [phone]  [] [facility] Outpatient Rehabilitation &  Therapy  [address]  P: [phone]  F: [phone]     Therapy Cancel/No Show note    Date: 2025  Patient: Rachelle East  : 1940  MRN: [id number]    Cancels/No Shows to date: 0    For today's appointment patient:    [x]  Cancelled    [] Rescheduled appointment    [] No-show     Reason given by patient:    [x]  Patient ill    []  Conflicting appointment    [] No transportation      [] Conflict with work    [] No reason given    [] Weather related    [] COVID-19    [] Other:      Comments:        [x] Next appointment was confirmed    Electronically signed by: Luis Rhoades PT

## 2025-02-25 ENCOUNTER — HOSPITAL ENCOUNTER (OUTPATIENT)
Age: 85
Setting detail: THERAPIES SERIES
Discharge: HOME | End: 2025-02-25
Attending: NEUROLOGICAL SURGERY
Payer: MEDICARE

## 2025-02-25 PROCEDURE — 97110 THERAPEUTIC EXERCISES: CPT

## 2025-02-25 NOTE — FLOWSHEET NOTE
[] [facility]  Outpatient Rehabilitation &  Therapy  [address]  [phone]  [phone] [] [facility]  Outpatient Rehabilitation &  Therapy  [address]  P: [phone]  F: [phone] [] [facility]  Outpatient Rehabilitation &  Therapy  [address]  P: [phone]  F: [phone] [] [facility]  Outpatient Rehabilitation &  Therapy  [address]  [phone]  [phone] [] [facility]  Outpatient Rehabilitation &  Therapy  [address]   P: [phone]  F: [phone]  [] [facility]  Outpatient Rehabilitation &  Therapy  [address]  P: [phone]  F: [phone] [x] [facility]  Outpatient Rehabilitation &  Therapy  [address]  P: [phone]  F: [phone] [] [facility]  Outpatient Rehabilitation &  Therapy  [address]  P: [phone]  F: [phone] [] [facility]  Outpatient Rehabilitation &  Therapy  [address]  P: [phone]  F: [phone]  [] [facility] Outpatient Rehabilitation &  Therapy  [address]  P: [phone]  F: [phone]     Physical Therapy Daily Treatment Note    Date:  2025  Patient Name:  Rachelle East    :  1940  MRN: [id number]  Physician: Jamal Wooten MD                                Insurance: [place] Medicare ;   BMN,  No Hard Max,  AUTH after eval with Cohere  25  Approved 8 visits from 25-3/28/25  Medical Diagnosis: S32.020D (ICD-10-CM) - Compression fracture of L2 vertebra with routine healing, subsequent encounter                       Rehab Codes: R26.81,  M54.50,  M25.651,  M25.652,  M62.551,  M62.552.   Onset Date: 24                                 Next 's appt: 3/31/25     Visit# / total visits:   (

## 2025-02-27 ENCOUNTER — HOSPITAL ENCOUNTER (OUTPATIENT)
Age: 85
Setting detail: THERAPIES SERIES
Discharge: HOME | End: 2025-02-27
Attending: NEUROLOGICAL SURGERY
Payer: MEDICARE

## 2025-02-27 PROCEDURE — 97110 THERAPEUTIC EXERCISES: CPT

## 2025-02-27 NOTE — FLOWSHEET NOTE
must sit due to pain  Improve LE ROM to at least 60 degrees of SLR, 5 degrees of hip extension and 15 degrees of hip IR to facilitate performing ADLs without pain.  Improve strength of bilateral hips to 3+/5, quads, hams, DF, and PF to at least 4+/5 so pt can walk in home so pt can meal prep without hip/back pain     Patient goals:  Less Pain     Pt. Education:  [x] Yes  [] No  [x] Reviewed Prior HEP/Ed  Method of Education: [x] Verbal  [x] Demo  [] Written  Comprehension of Education:  [x] Verbalizes understanding.  [] Demonstrates understanding.  [x] Needs review.  [] Demonstrates/verbalizes HEP/Ed previously given.     Access Code: [email or web address]  URL: [email or web address]  Date: 01/28/2025  Prepared by: Luis Rhoades     Exercises  - Supine Hip Adduction Isometric with Ball  - 2 x daily - 7 x weekly - 1 sets - 10 reps - 2 seconds hold  - Bent Knee Fallouts  - 2 x daily - 7 x weekly - 1 sets - 10 reps  - Supine Posterior Pelvic Tilt  - 2 x daily - 7 x weekly - 1 sets - 10 reps  - Supine Bridge  - 2 x daily - 7 x weekly - 1 sets - 5 reps    Access Code: [email or web address]  URL: [email or web address]  Date: 01/30/2025  Prepared by: Luis Rhoades    Exercises  - Modified Valeriy Stretch  - 2 x daily - 7 x weekly - 1 sets - 2 reps - 30 seconds hold  - Clamshell  - 2 x daily - 7 x weekly - 1 sets - 10 reps  - Sidelying Hip Abduction  - 2 x daily - 7 x weekly - 1 sets - 10 reps  - Seated March  - 2 x daily - 7 x weekly - 1 sets - 10 reps  - Seated Long Arc Quad  - 2 x daily - 7 x weekly - 1 sets - 10 reps  - Seated Heel Toe Raises  - 2 x daily - 7 x weekly - 1 sets - 10 reps    Access Code: [email or web address]  URL: [email or web address]  Date: 02/06/2025  Prepared by: Luis Rhoades    Exercises  - Sidelying Reverse Clamshell  - 2 x daily - 7 x weekly - 1 sets - 10 reps  - Prone Hip Extension  - 2 x daily - 7 x weekly - 1 sets - 10 reps  - Supine Hamstring Stretch  - 2 x daily - 7 x weekly - 1 sets - 4 reps - 15 seconds hold  -

## 2025-03-04 ENCOUNTER — HOSPITAL ENCOUNTER (OUTPATIENT)
Age: 85
Setting detail: THERAPIES SERIES
Discharge: HOME | End: 2025-03-04
Attending: NEUROLOGICAL SURGERY
Payer: MEDICARE

## 2025-03-04 PROCEDURE — 97110 THERAPEUTIC EXERCISES: CPT

## 2025-03-04 NOTE — DISCHARGE SUMMARY
[email or web address]  Date: 02/11/2025  Prepared by: Luis Rhoades    Exercises    - Standing Marching  - 2 x daily - 7 x weekly - 1 sets - 10 reps  - Supine Straight Leg Raises  - 2 x daily - 7 x weekly - 1 sets - 10 reps  - Supine Piriformis Stretch with Leg Straight  - 2 x daily - 7 x weekly - 1 sets - 3 reps - 15 seconds hold  - Supine Hamstring Stretch  - 2 x daily - 7 x weekly - 1 sets - 3 reps - 15 seconds hold       Plan: [] Continue current frequency toward long and short term goals.    [x] Specific Instructions for subsequent treatments: Discharge to [facility]; pt adreeable to this plan.    Time In:  11:41 AM            Time Out: 12:49  PM  Pt not charged for full time in PT today;  PT was with another pt for part of the time    Electronically signed by:  Luis Rhoades, PT

## 2025-03-07 ENCOUNTER — TELEPHONE (OUTPATIENT)
Age: 85
End: 2025-03-07

## 2025-03-07 DIAGNOSIS — G62.9 SMALL FIBER NEUROPATHY: Primary | ICD-10-CM

## 2025-03-07 RX ORDER — OXYCODONE AND ACETAMINOPHEN 7.5; 325 MG/1; MG/1
1 TABLET ORAL EVERY 4 HOURS PRN
Qty: 180 TABLET | Refills: 0 | Status: SHIPPED | OUTPATIENT
Start: 2025-03-07 | End: 2025-04-06

## 2025-03-07 NOTE — TELEPHONE ENCOUNTER
Spoke with patient- she needs refill on percocet- last fill was only for 20 day supply- script sent- she is due for fill

## 2025-04-02 RX ORDER — ISOSORBIDE MONONITRATE 30 MG/1
30 TABLET, EXTENDED RELEASE ORAL EVERY MORNING
Qty: 90 TABLET | Refills: 3 | Status: SHIPPED | OUTPATIENT
Start: 2025-04-02

## 2025-04-03 ENCOUNTER — APPOINTMENT (OUTPATIENT)
Dept: CT IMAGING | Age: 85
End: 2025-04-03
Payer: MEDICARE

## 2025-04-03 ENCOUNTER — TELEPHONE (OUTPATIENT)
Age: 85
End: 2025-04-03

## 2025-04-03 ENCOUNTER — HOSPITAL ENCOUNTER (OUTPATIENT)
Age: 85
Setting detail: OBSERVATION
Discharge: HOME | End: 2025-04-04
Attending: EMERGENCY MEDICINE | Admitting: FAMILY MEDICINE
Payer: MEDICARE

## 2025-04-03 ENCOUNTER — APPOINTMENT (OUTPATIENT)
Dept: MRI IMAGING | Age: 85
End: 2025-04-03
Payer: MEDICARE

## 2025-04-03 DIAGNOSIS — R42 DIZZINESS: Primary | ICD-10-CM

## 2025-04-03 DIAGNOSIS — R42 VERTIGO: ICD-10-CM

## 2025-04-03 LAB
ALBUMIN SERPL-MCNC: 3.9 G/DL (ref 3.5–5.2)
ALBUMIN/GLOB SERPL: 1.4 {RATIO} (ref 1–2.5)
ALP SERPL-CCNC: 72 U/L (ref 35–104)
ALT SERPL-CCNC: 12 U/L (ref 5–33)
ANION GAP SERPL CALCULATED.3IONS-SCNC: 16 MMOL/L (ref 9–17)
AST SERPL-CCNC: 16 U/L
BASOPHILS # BLD: 0 K/UL (ref 0–0.2)
BASOPHILS NFR BLD: 0 % (ref 0–2)
BILIRUB SERPL-MCNC: 0.3 MG/DL (ref 0.3–1.2)
BUN SERPL-MCNC: 15 MG/DL (ref 8–23)
CALCIUM SERPL-MCNC: 9.1 MG/DL (ref 8.6–10.4)
CHLORIDE SERPL-SCNC: 101 MMOL/L (ref 98–107)
CO2 SERPL-SCNC: 22 MMOL/L (ref 20–31)
CREAT SERPL-MCNC: 1.1 MG/DL (ref 0.5–0.9)
EOSINOPHIL # BLD: 0.2 K/UL (ref 0–0.4)
EOSINOPHILS RELATIVE PERCENT: 3 % (ref 1–4)
ERYTHROCYTE [DISTWIDTH] IN BLOOD BY AUTOMATED COUNT: 13.5 % (ref 12.5–15.4)
GFR, ESTIMATED: 50 ML/MIN/1.73M2
GLUCOSE BLD-MCNC: 91 MG/DL (ref 65–105)
GLUCOSE SERPL-MCNC: 155 MG/DL (ref 70–99)
HCT VFR BLD AUTO: 32.6 % (ref 36–46)
HGB BLD-MCNC: 10.9 G/DL (ref 12–16)
LYMPHOCYTES NFR BLD: 0.9 K/UL (ref 1–4.8)
LYMPHOCYTES RELATIVE PERCENT: 13 % (ref 24–44)
MCH RBC QN AUTO: 30.1 PG (ref 26–34)
MCHC RBC AUTO-ENTMCNC: 33.5 G/DL (ref 31–37)
MCV RBC AUTO: 89.8 FL (ref 80–100)
MONOCYTES NFR BLD: 0.5 K/UL (ref 0.1–1.2)
MONOCYTES NFR BLD: 7 % (ref 2–11)
NEUTROPHILS NFR BLD: 77 % (ref 36–66)
NEUTS SEG NFR BLD: 5.3 K/UL (ref 1.8–7.7)
PLATELET # BLD AUTO: 165 K/UL (ref 140–450)
PMV BLD AUTO: 7.4 FL (ref 6–12)
POTASSIUM SERPL-SCNC: 4.2 MMOL/L (ref 3.7–5.3)
PROT SERPL-MCNC: 6.6 G/DL (ref 6.4–8.3)
RBC # BLD AUTO: 3.63 M/UL (ref 4–5.2)
SODIUM SERPL-SCNC: 139 MMOL/L (ref 135–144)
TROPONIN I SERPL HS-MCNC: 23 NG/L (ref 0–14)
TROPONIN I SERPL HS-MCNC: 26 NG/L (ref 0–14)
WBC OTHER # BLD: 6.9 K/UL (ref 3.5–11)

## 2025-04-03 PROCEDURE — 99285 EMERGENCY DEPT VISIT HI MDM: CPT

## 2025-04-03 PROCEDURE — 93005 ELECTROCARDIOGRAM TRACING: CPT | Performed by: EMERGENCY MEDICINE

## 2025-04-03 PROCEDURE — 6370000000 HC RX 637 (ALT 250 FOR IP): Performed by: FAMILY MEDICINE

## 2025-04-03 PROCEDURE — 96375 TX/PRO/DX INJ NEW DRUG ADDON: CPT

## 2025-04-03 PROCEDURE — 2580000003 HC RX 258: Performed by: EMERGENCY MEDICINE

## 2025-04-03 PROCEDURE — 70450 CT HEAD/BRAIN W/O DYE: CPT

## 2025-04-03 PROCEDURE — 84484 ASSAY OF TROPONIN QUANT: CPT

## 2025-04-03 PROCEDURE — 70551 MRI BRAIN STEM W/O DYE: CPT

## 2025-04-03 PROCEDURE — 80053 COMPREHEN METABOLIC PANEL: CPT

## 2025-04-03 PROCEDURE — 99222 1ST HOSP IP/OBS MODERATE 55: CPT | Performed by: PSYCHIATRY & NEUROLOGY

## 2025-04-03 PROCEDURE — 2580000003 HC RX 258: Performed by: FAMILY MEDICINE

## 2025-04-03 PROCEDURE — 6360000002 HC RX W HCPCS: Performed by: FAMILY MEDICINE

## 2025-04-03 PROCEDURE — 6360000002 HC RX W HCPCS: Performed by: EMERGENCY MEDICINE

## 2025-04-03 PROCEDURE — 82947 ASSAY GLUCOSE BLOOD QUANT: CPT

## 2025-04-03 PROCEDURE — 6370000000 HC RX 637 (ALT 250 FOR IP): Performed by: EMERGENCY MEDICINE

## 2025-04-03 PROCEDURE — G0378 HOSPITAL OBSERVATION PER HR: HCPCS

## 2025-04-03 PROCEDURE — 85025 COMPLETE CBC W/AUTO DIFF WBC: CPT

## 2025-04-03 PROCEDURE — 96374 THER/PROPH/DIAG INJ IV PUSH: CPT

## 2025-04-03 PROCEDURE — 96372 THER/PROPH/DIAG INJ SC/IM: CPT

## 2025-04-03 RX ORDER — MECLIZINE HCL 12.5 MG 12.5 MG/1
25 TABLET ORAL ONCE
Status: COMPLETED | OUTPATIENT
Start: 2025-04-03 | End: 2025-04-03

## 2025-04-03 RX ORDER — OXYCODONE AND ACETAMINOPHEN 5; 325 MG/1; MG/1
1 TABLET ORAL ONCE
Refills: 0 | Status: COMPLETED | OUTPATIENT
Start: 2025-04-03 | End: 2025-04-03

## 2025-04-03 RX ORDER — LORAZEPAM 1 MG/1
1 TABLET ORAL ONCE
Status: COMPLETED | OUTPATIENT
Start: 2025-04-03 | End: 2025-04-03

## 2025-04-03 RX ORDER — CETIRIZINE HYDROCHLORIDE 10 MG/1
10 TABLET ORAL ONCE
Status: COMPLETED | OUTPATIENT
Start: 2025-04-03 | End: 2025-04-03

## 2025-04-03 RX ORDER — OXYCODONE AND ACETAMINOPHEN 5; 325 MG/1; MG/1
1 TABLET ORAL EVERY 4 HOURS PRN
Refills: 0 | Status: DISCONTINUED | OUTPATIENT
Start: 2025-04-03 | End: 2025-04-04 | Stop reason: HOSPADM

## 2025-04-03 RX ORDER — LORAZEPAM 0.5 MG/1
1 TABLET ORAL EVERY 6 HOURS PRN
Status: DISCONTINUED | OUTPATIENT
Start: 2025-04-03 | End: 2025-04-04 | Stop reason: HOSPADM

## 2025-04-03 RX ORDER — POTASSIUM CHLORIDE 1500 MG/1
40 TABLET, EXTENDED RELEASE ORAL PRN
Status: DISCONTINUED | OUTPATIENT
Start: 2025-04-03 | End: 2025-04-04 | Stop reason: HOSPADM

## 2025-04-03 RX ORDER — SODIUM CHLORIDE 0.9 % (FLUSH) 0.9 %
5-40 SYRINGE (ML) INJECTION EVERY 12 HOURS SCHEDULED
Status: DISCONTINUED | OUTPATIENT
Start: 2025-04-03 | End: 2025-04-04 | Stop reason: HOSPADM

## 2025-04-03 RX ORDER — HYDRALAZINE HYDROCHLORIDE 20 MG/ML
10 INJECTION INTRAMUSCULAR; INTRAVENOUS EVERY 6 HOURS PRN
Status: DISCONTINUED | OUTPATIENT
Start: 2025-04-03 | End: 2025-04-04 | Stop reason: HOSPADM

## 2025-04-03 RX ORDER — VITAMIN B COMPLEX
2000 TABLET ORAL DAILY
Status: DISCONTINUED | OUTPATIENT
Start: 2025-04-03 | End: 2025-04-04 | Stop reason: HOSPADM

## 2025-04-03 RX ORDER — ENOXAPARIN SODIUM 100 MG/ML
40 INJECTION SUBCUTANEOUS DAILY
Status: DISCONTINUED | OUTPATIENT
Start: 2025-04-03 | End: 2025-04-04 | Stop reason: HOSPADM

## 2025-04-03 RX ORDER — TEMAZEPAM 15 MG/1
15 CAPSULE ORAL NIGHTLY PRN
Status: DISCONTINUED | OUTPATIENT
Start: 2025-04-03 | End: 2025-04-04 | Stop reason: HOSPADM

## 2025-04-03 RX ORDER — CARVEDILOL 3.12 MG/1
6.25 TABLET ORAL 2 TIMES DAILY WITH MEALS
Status: DISCONTINUED | OUTPATIENT
Start: 2025-04-03 | End: 2025-04-03

## 2025-04-03 RX ORDER — ONDANSETRON 4 MG/1
4 TABLET, ORALLY DISINTEGRATING ORAL EVERY 8 HOURS PRN
Status: DISCONTINUED | OUTPATIENT
Start: 2025-04-03 | End: 2025-04-04 | Stop reason: HOSPADM

## 2025-04-03 RX ORDER — CETIRIZINE HYDROCHLORIDE 10 MG/1
10 TABLET ORAL DAILY
Status: DISCONTINUED | OUTPATIENT
Start: 2025-04-04 | End: 2025-04-04 | Stop reason: HOSPADM

## 2025-04-03 RX ORDER — ONDANSETRON 2 MG/ML
4 INJECTION INTRAMUSCULAR; INTRAVENOUS EVERY 6 HOURS PRN
Status: DISCONTINUED | OUTPATIENT
Start: 2025-04-03 | End: 2025-04-04 | Stop reason: HOSPADM

## 2025-04-03 RX ORDER — MECLIZINE HCL 12.5 MG 12.5 MG/1
25 TABLET ORAL 3 TIMES DAILY
Status: COMPLETED | OUTPATIENT
Start: 2025-04-03 | End: 2025-04-04

## 2025-04-03 RX ORDER — POTASSIUM CHLORIDE 7.45 MG/ML
10 INJECTION INTRAVENOUS PRN
Status: DISCONTINUED | OUTPATIENT
Start: 2025-04-03 | End: 2025-04-04 | Stop reason: HOSPADM

## 2025-04-03 RX ORDER — LEVOTHYROXINE SODIUM 50 UG/1
100 TABLET ORAL DAILY
Status: DISCONTINUED | OUTPATIENT
Start: 2025-04-03 | End: 2025-04-04 | Stop reason: HOSPADM

## 2025-04-03 RX ORDER — SODIUM CHLORIDE 9 MG/ML
INJECTION, SOLUTION INTRAVENOUS CONTINUOUS
Status: ACTIVE | OUTPATIENT
Start: 2025-04-03 | End: 2025-04-04

## 2025-04-03 RX ORDER — OXYCODONE AND ACETAMINOPHEN 7.5; 325 MG/1; MG/1
1 TABLET ORAL EVERY 4 HOURS PRN
Refills: 0 | Status: DISCONTINUED | OUTPATIENT
Start: 2025-04-03 | End: 2025-04-03

## 2025-04-03 RX ORDER — SODIUM CHLORIDE 0.9 % (FLUSH) 0.9 %
5-40 SYRINGE (ML) INJECTION PRN
Status: DISCONTINUED | OUTPATIENT
Start: 2025-04-03 | End: 2025-04-04 | Stop reason: HOSPADM

## 2025-04-03 RX ORDER — MULTIVITAMIN WITH IRON
1000 TABLET ORAL DAILY
Status: DISCONTINUED | OUTPATIENT
Start: 2025-04-03 | End: 2025-04-04 | Stop reason: HOSPADM

## 2025-04-03 RX ORDER — FAMOTIDINE 20 MG/1
20 TABLET, FILM COATED ORAL 2 TIMES DAILY
Status: DISCONTINUED | OUTPATIENT
Start: 2025-04-03 | End: 2025-04-04 | Stop reason: HOSPADM

## 2025-04-03 RX ORDER — MAGNESIUM SULFATE IN WATER 40 MG/ML
2000 INJECTION, SOLUTION INTRAVENOUS PRN
Status: DISCONTINUED | OUTPATIENT
Start: 2025-04-03 | End: 2025-04-04 | Stop reason: HOSPADM

## 2025-04-03 RX ORDER — CITALOPRAM HYDROBROMIDE 20 MG/1
40 TABLET ORAL DAILY
Status: DISCONTINUED | OUTPATIENT
Start: 2025-04-03 | End: 2025-04-04 | Stop reason: HOSPADM

## 2025-04-03 RX ORDER — CARVEDILOL 12.5 MG/1
12.5 TABLET ORAL 2 TIMES DAILY WITH MEALS
Status: DISCONTINUED | OUTPATIENT
Start: 2025-04-04 | End: 2025-04-04 | Stop reason: HOSPADM

## 2025-04-03 RX ORDER — ACETAMINOPHEN 325 MG/1
650 TABLET ORAL EVERY 6 HOURS PRN
Status: DISCONTINUED | OUTPATIENT
Start: 2025-04-03 | End: 2025-04-04 | Stop reason: HOSPADM

## 2025-04-03 RX ORDER — INSULIN LISPRO 100 [IU]/ML
0-4 INJECTION, SOLUTION INTRAVENOUS; SUBCUTANEOUS
Status: DISCONTINUED | OUTPATIENT
Start: 2025-04-03 | End: 2025-04-04 | Stop reason: HOSPADM

## 2025-04-03 RX ORDER — POLYETHYLENE GLYCOL 3350 17 G/17G
17 POWDER, FOR SOLUTION ORAL DAILY PRN
Status: DISCONTINUED | OUTPATIENT
Start: 2025-04-03 | End: 2025-04-04 | Stop reason: HOSPADM

## 2025-04-03 RX ORDER — OXYCODONE HYDROCHLORIDE 5 MG/1
2.5 TABLET ORAL EVERY 4 HOURS PRN
Refills: 0 | Status: DISCONTINUED | OUTPATIENT
Start: 2025-04-03 | End: 2025-04-04 | Stop reason: HOSPADM

## 2025-04-03 RX ORDER — ISOSORBIDE MONONITRATE 30 MG/1
30 TABLET, EXTENDED RELEASE ORAL EVERY MORNING
Status: DISCONTINUED | OUTPATIENT
Start: 2025-04-04 | End: 2025-04-04 | Stop reason: HOSPADM

## 2025-04-03 RX ORDER — BUPROPION HYDROCHLORIDE 150 MG/1
300 TABLET ORAL EVERY MORNING
Status: DISCONTINUED | OUTPATIENT
Start: 2025-04-04 | End: 2025-04-04 | Stop reason: HOSPADM

## 2025-04-03 RX ORDER — SODIUM CHLORIDE 9 MG/ML
INJECTION, SOLUTION INTRAVENOUS PRN
Status: DISCONTINUED | OUTPATIENT
Start: 2025-04-03 | End: 2025-04-04 | Stop reason: HOSPADM

## 2025-04-03 RX ORDER — ACETAMINOPHEN 650 MG/1
650 SUPPOSITORY RECTAL EVERY 6 HOURS PRN
Status: DISCONTINUED | OUTPATIENT
Start: 2025-04-03 | End: 2025-04-04 | Stop reason: HOSPADM

## 2025-04-03 RX ORDER — ONDANSETRON 2 MG/ML
4 INJECTION INTRAMUSCULAR; INTRAVENOUS ONCE
Status: COMPLETED | OUTPATIENT
Start: 2025-04-03 | End: 2025-04-03

## 2025-04-03 RX ADMIN — LEVOTHYROXINE SODIUM 100 MCG: 0.05 TABLET ORAL at 15:29

## 2025-04-03 RX ADMIN — MECLIZINE 25 MG: 12.5 TABLET ORAL at 15:30

## 2025-04-03 RX ADMIN — FAMOTIDINE 20 MG: 10 INJECTION, SOLUTION INTRAVENOUS at 13:10

## 2025-04-03 RX ADMIN — MECLIZINE 25 MG: 12.5 TABLET ORAL at 21:21

## 2025-04-03 RX ADMIN — SODIUM CHLORIDE: 0.9 INJECTION, SOLUTION INTRAVENOUS at 18:28

## 2025-04-03 RX ADMIN — ONDANSETRON 4 MG: 2 INJECTION, SOLUTION INTRAMUSCULAR; INTRAVENOUS at 12:38

## 2025-04-03 RX ADMIN — TEMAZEPAM 15 MG: 15 CAPSULE ORAL at 21:21

## 2025-04-03 RX ADMIN — LORAZEPAM 1 MG: 1 TABLET ORAL at 10:24

## 2025-04-03 RX ADMIN — CITALOPRAM HYDROBROMIDE 40 MG: 20 TABLET ORAL at 18:45

## 2025-04-03 RX ADMIN — CARVEDILOL 6.25 MG: 3.12 TABLET, FILM COATED ORAL at 15:29

## 2025-04-03 RX ADMIN — ENOXAPARIN SODIUM 40 MG: 100 INJECTION SUBCUTANEOUS at 15:29

## 2025-04-03 RX ADMIN — OXYCODONE HYDROCHLORIDE AND ACETAMINOPHEN 1 TABLET: 5; 325 TABLET ORAL at 13:10

## 2025-04-03 RX ADMIN — PANTOPRAZOLE SODIUM 40 MG: 40 INJECTION, POWDER, FOR SOLUTION INTRAVENOUS at 13:12

## 2025-04-03 RX ADMIN — OXYCODONE HYDROCHLORIDE 2.5 MG: 5 TABLET ORAL at 21:21

## 2025-04-03 RX ADMIN — OXYCODONE HYDROCHLORIDE AND ACETAMINOPHEN 1 TABLET: 5; 325 TABLET ORAL at 21:21

## 2025-04-03 RX ADMIN — FAMOTIDINE 20 MG: 20 TABLET, FILM COATED ORAL at 21:21

## 2025-04-03 RX ADMIN — MECLIZINE 25 MG: 12.5 TABLET ORAL at 10:23

## 2025-04-03 RX ADMIN — LORAZEPAM 1 MG: 0.5 TABLET ORAL at 18:45

## 2025-04-03 RX ADMIN — CETIRIZINE HYDROCHLORIDE 10 MG: 10 TABLET, FILM COATED ORAL at 13:10

## 2025-04-03 RX ADMIN — Medication 2000 UNITS: at 15:29

## 2025-04-03 RX ADMIN — CYANOCOBALAMIN TAB 500 MCG 1000 MCG: 500 TAB at 18:45

## 2025-04-03 RX ADMIN — METFORMIN HYDROCHLORIDE 1000 MG: 500 TABLET ORAL at 15:29

## 2025-04-03 ASSESSMENT — PAIN SCALES - GENERAL
PAINLEVEL_OUTOF10: 4
PAINLEVEL_OUTOF10: 4
PAINLEVEL_OUTOF10: 0

## 2025-04-03 ASSESSMENT — PAIN DESCRIPTION - DESCRIPTORS
DESCRIPTORS: NUMBNESS
DESCRIPTORS: DISCOMFORT

## 2025-04-03 ASSESSMENT — PAIN DESCRIPTION - LOCATION
LOCATION: HEAD
LOCATION: FOOT

## 2025-04-03 ASSESSMENT — PAIN - FUNCTIONAL ASSESSMENT
PAIN_FUNCTIONAL_ASSESSMENT: ACTIVITIES ARE NOT PREVENTED
PAIN_FUNCTIONAL_ASSESSMENT: NONE - DENIES PAIN

## 2025-04-03 ASSESSMENT — PAIN DESCRIPTION - ORIENTATION: ORIENTATION: RIGHT;LEFT

## 2025-04-03 ASSESSMENT — PAIN DESCRIPTION - PAIN TYPE: TYPE: CHRONIC PAIN

## 2025-04-03 NOTE — ED PROVIDER NOTES
[facility] Emergency Department  [address]  [place] [address]  Phone: [phone]  Fax: [phone]      Patient Name:  Rachelle East  Medical Record Number:  [id number]  YOB: 1940  Date of Service:  4/3/2025  Primary Care Physician:  Kunal Campbell MD      CHIEF COMPLAINT:       Chief Complaint   Patient presents with    Dizziness     Started this morning, n/v. Ems gave 4mg of zofran.       HISTORY OF PRESENT ILLNESS:    Rachelle East is a 84 y.o. female who presents with the complaint of dizziness, nausea, vomiting.  Patient states that she got up to the side of her bed this morning and had severe spinning as well as nausea and vomiting.  She states she has had mild symptoms like this in the past with an ear infection but this is much worse unable to even stand without falling over.  She denies any headache.  Was given Zofran prior to arrival.    CURRENT MEDICATIONS:      Previous Medications    BUPROPION (WELLBUTRIN XL) 300 MG EXTENDED RELEASE TABLET    TAKE ONE TABLET BY MOUTH EVERY MORNING    CARVEDILOL (COREG) 6.25 MG TABLET    Take 1 tablet by mouth 2 times daily (with meals)    CITALOPRAM (CELEXA) 40 MG TABLET    TAKE 1 TABLET BY MOUTH DAILY    EPINEPHRINE (EPIPEN) 0.3 MG/0.3ML SOAJ INJECTION    Inject 0.3 mLs into the muscle once as needed    FAMOTIDINE (PEPCID) 20 MG TABLET    Take 1 tablet by mouth 2 times daily    FEXOFENADINE (ALLEGRA) 180 MG TABLET    Take 1 tablet by mouth daily 4 tabs daily    ISOSORBIDE MONONITRATE (IMDUR) 30 MG EXTENDED RELEASE TABLET    TAKE 1 TABLET BY MOUTH EVERY MORNING    LEVOTHYROXINE (SYNTHROID) 100 MCG TABLET    Take 1 tablet by mouth daily    LORAZEPAM (ATIVAN) 1 MG TABLET    Take 1 tablet by mouth every 6 hours as needed for Anxiety for up to 90 days. Max Daily Amount: 4 mg    METFORMIN (GLUCOPHAGE) 1000 MG TABLET    TAKE 1 TABLET BY MOUTH 2 TIMES A DAY (MORNING AND AT BEDTIME)    METHYLPREDNISOLONE (MEDROL DOSEPACK) 4 MG TABLET

## 2025-04-03 NOTE — TELEPHONE ENCOUNTER
Today patient felt faint and couldn't open eyes because it causes dizziness. Then that started dry heaving and throwing up. Then called squad patient is currently at the [facility] ER. Possible admission to come just wanted Dr. GE to know what was going on.

## 2025-04-03 NOTE — ED NOTES
ED to inpatient nurses report      Chief Complaint:  Chief Complaint   Patient presents with    Dizziness     Started this morning, n/v. Ems gave 4mg of zofran.     Present to ED from: home    MOA:     LOC: alert and orientated to name, place, date  Mobility: Independent  Oxygen Baseline: 94 % room air    Current needs required: none   Pending ED orders: ad,[place]  Present condition: stable    Why did the patient come to the ED? vertigo  What is the plan? monitor  Any procedures or intervention occur? none  Any safety concerns??fall due to dizzy  CODE STATUS Full Code  Diet ADULT DIET; Regular    Mental Status:  Level of Consciousness: Alert (0)    Psych Assessment:   Psychosocial  Psychosocial (WDL): Within Defined Limits  Vital signs   Vitals:    04/03/25 1313 04/03/25 1442 04/03/25 1512 04/03/25 1542   BP: (!) 167/90 (!) 165/96 (!) 172/91 (!) 169/87   Pulse: 87 95 93 91   Resp:       Temp:       TempSrc:       SpO2: 95% 93% 93% 94%   Weight:       Height:            Vitals:  Patient Vitals for the past 24 hrs:   BP Temp Temp src Pulse Resp SpO2 Height Weight   04/03/25 1542 (!) 169/87 -- -- 91 -- 94 % -- --   04/03/25 1512 (!) 172/91 -- -- 93 -- 93 % -- --   04/03/25 1442 (!) 165/96 -- -- 95 -- 93 % -- --   04/03/25 1313 (!) 167/90 -- -- 87 -- 95 % -- --   04/03/25 1144 (!) 157/91 -- -- 85 -- 98 % -- --   04/03/25 1128 (!) 183/105 -- -- 83 -- 93 % -- --   04/03/25 1019 (!) 183/81 97.3 °F (36.3 °C) Temporal 79 18 100 % 1.575 m (5' 2\") 73 kg (161 lb)      Visit Vitals  BP (!) 169/87   Pulse 91   Temp 97.3 °F (36.3 °C) (Temporal)   Resp 18   Ht 1.575 m (5' 2\")   Wt 73 kg (161 lb)   SpO2 94%   BMI 29.45 kg/m²        LDAs:   Peripheral IV 04/03/25 Right Antecubital (Active)   Site Assessment Clean, dry & intact 04/03/25 1022   Line Status Blood return noted 04/03/25 1022   Phlebitis Assessment No symptoms 04/03/25 1022   Infiltration Assessment 0 04/03/25 1022   Dressing Status Clean, dry & intact 04/03/25 1022  WITH SYNTHES AND PRE-OP REGIONAL BLOCK       PAST MEDICAL HISTORY       Past Medical History:   Diagnosis Date    Closed fracture of right radius     Colon polyps     Gout     History of eye surgery     Hypertension     Idiopathic angioedema     Neuropathy     ELIZABETH (obstructive sleep apnea)     no machine    Osteoarthritis     Thyroid disease     Thyroid mass     Type 2 diabetes mellitus without complication            Consults:  IP CONSULT TO NEUROLOGY     Treatment Team:   Treatment Team:   Noah Sue MD Diehl, Tracy, RN Arshad, Ahmed B, MD    Treatment:  ED Course as of 04/03/25 1557   Thu Apr 03, 2025   1035 EKG shows a normal sinus rhythm.  Ventricular rate of 73.  No ST elevation or depression.  No T wave inversion.  cQT interval is prolonged at 491 ms. [NA]      ED Course User Index  [NA] Joyce Montejo MD        Electronically signed by Le Barr RN on 4/3/2025 at 3:57 PM

## 2025-04-04 ENCOUNTER — TELEPHONE (OUTPATIENT)
Age: 85
End: 2025-04-04

## 2025-04-04 VITALS
HEIGHT: 62 IN | OXYGEN SATURATION: 96 % | WEIGHT: 168.65 LBS | RESPIRATION RATE: 18 BRPM | TEMPERATURE: 99.3 F | BODY MASS INDEX: 31.04 KG/M2 | HEART RATE: 86 BPM | DIASTOLIC BLOOD PRESSURE: 93 MMHG | SYSTOLIC BLOOD PRESSURE: 121 MMHG

## 2025-04-04 LAB
ALBUMIN SERPL-MCNC: 3.9 G/DL (ref 3.5–5.2)
ALBUMIN/GLOB SERPL: 1.4 {RATIO} (ref 1–2.5)
ALP SERPL-CCNC: 68 U/L (ref 35–104)
ALT SERPL-CCNC: 11 U/L (ref 5–33)
ANION GAP SERPL CALCULATED.3IONS-SCNC: 12 MMOL/L (ref 9–17)
AST SERPL-CCNC: 15 U/L
BASOPHILS # BLD: 0 K/UL (ref 0–0.2)
BASOPHILS NFR BLD: 0 % (ref 0–2)
BILIRUB SERPL-MCNC: 0.4 MG/DL (ref 0.3–1.2)
BUN SERPL-MCNC: 11 MG/DL (ref 8–23)
CALCIUM SERPL-MCNC: 9.1 MG/DL (ref 8.6–10.4)
CHLORIDE SERPL-SCNC: 104 MMOL/L (ref 98–107)
CO2 SERPL-SCNC: 27 MMOL/L (ref 20–31)
CREAT SERPL-MCNC: 1.2 MG/DL (ref 0.5–0.9)
EKG ATRIAL RATE: 73 BPM
EKG P AXIS: 49 DEGREES
EKG P-R INTERVAL: 140 MS
EKG Q-T INTERVAL: 446 MS
EKG QRS DURATION: 92 MS
EKG QTC CALCULATION (BAZETT): 491 MS
EKG R AXIS: 6 DEGREES
EKG T AXIS: 36 DEGREES
EKG VENTRICULAR RATE: 73 BPM
EOSINOPHIL # BLD: 0.1 K/UL (ref 0–0.4)
EOSINOPHILS RELATIVE PERCENT: 3 % (ref 1–4)
ERYTHROCYTE [DISTWIDTH] IN BLOOD BY AUTOMATED COUNT: 13.6 % (ref 12.5–15.4)
GFR, ESTIMATED: 45 ML/MIN/1.73M2
GLUCOSE BLD-MCNC: 101 MG/DL (ref 65–105)
GLUCOSE SERPL-MCNC: 91 MG/DL (ref 70–99)
HCT VFR BLD AUTO: 34.6 % (ref 36–46)
HGB BLD-MCNC: 11.8 G/DL (ref 12–16)
LYMPHOCYTES NFR BLD: 1.1 K/UL (ref 1–4.8)
LYMPHOCYTES RELATIVE PERCENT: 22 % (ref 24–44)
MCH RBC QN AUTO: 30.6 PG (ref 26–34)
MCHC RBC AUTO-ENTMCNC: 34 G/DL (ref 31–37)
MCV RBC AUTO: 90.1 FL (ref 80–100)
MONOCYTES NFR BLD: 0.5 K/UL (ref 0.1–1.2)
MONOCYTES NFR BLD: 10 % (ref 2–11)
NEUTROPHILS NFR BLD: 65 % (ref 36–66)
NEUTS SEG NFR BLD: 3.3 K/UL (ref 1.8–7.7)
PLATELET # BLD AUTO: 171 K/UL (ref 140–450)
PMV BLD AUTO: 7.1 FL (ref 6–12)
POTASSIUM SERPL-SCNC: 3.9 MMOL/L (ref 3.7–5.3)
PROT SERPL-MCNC: 6.6 G/DL (ref 6.4–8.3)
RBC # BLD AUTO: 3.84 M/UL (ref 4–5.2)
SODIUM SERPL-SCNC: 143 MMOL/L (ref 135–144)
WBC OTHER # BLD: 5.1 K/UL (ref 3.5–11)

## 2025-04-04 PROCEDURE — G0378 HOSPITAL OBSERVATION PER HR: HCPCS

## 2025-04-04 PROCEDURE — 2580000003 HC RX 258: Performed by: FAMILY MEDICINE

## 2025-04-04 PROCEDURE — 82947 ASSAY GLUCOSE BLOOD QUANT: CPT

## 2025-04-04 PROCEDURE — 6370000000 HC RX 637 (ALT 250 FOR IP): Performed by: FAMILY MEDICINE

## 2025-04-04 PROCEDURE — 97535 SELF CARE MNGMENT TRAINING: CPT

## 2025-04-04 PROCEDURE — 97166 OT EVAL MOD COMPLEX 45 MIN: CPT

## 2025-04-04 PROCEDURE — 36415 COLL VENOUS BLD VENIPUNCTURE: CPT

## 2025-04-04 PROCEDURE — 6360000002 HC RX W HCPCS: Performed by: FAMILY MEDICINE

## 2025-04-04 PROCEDURE — 97116 GAIT TRAINING THERAPY: CPT

## 2025-04-04 PROCEDURE — 93010 ELECTROCARDIOGRAM REPORT: CPT | Performed by: STUDENT IN AN ORGANIZED HEALTH CARE EDUCATION/TRAINING PROGRAM

## 2025-04-04 PROCEDURE — 97162 PT EVAL MOD COMPLEX 30 MIN: CPT

## 2025-04-04 PROCEDURE — 80053 COMPREHEN METABOLIC PANEL: CPT

## 2025-04-04 PROCEDURE — 99222 1ST HOSP IP/OBS MODERATE 55: CPT | Performed by: FAMILY MEDICINE

## 2025-04-04 PROCEDURE — 85025 COMPLETE CBC W/AUTO DIFF WBC: CPT

## 2025-04-04 PROCEDURE — 96376 TX/PRO/DX INJ SAME DRUG ADON: CPT

## 2025-04-04 RX ORDER — ONDANSETRON 4 MG/1
4 TABLET, ORALLY DISINTEGRATING ORAL EVERY 8 HOURS PRN
Qty: 21 TABLET | Refills: 1 | Status: SHIPPED | OUTPATIENT
Start: 2025-04-04

## 2025-04-04 RX ORDER — DIAZEPAM 2 MG/1
2 TABLET ORAL EVERY 8 HOURS SCHEDULED
Qty: 30 TABLET | Refills: 0 | Status: SHIPPED | OUTPATIENT
Start: 2025-04-04 | End: 2025-04-14

## 2025-04-04 RX ORDER — BUPROPION HYDROCHLORIDE 300 MG/1
300 TABLET ORAL EVERY MORNING
Qty: 30 TABLET | Refills: 5 | Status: SHIPPED | OUTPATIENT
Start: 2025-04-04

## 2025-04-04 RX ORDER — DIAZEPAM 2 MG/1
2 TABLET ORAL 2 TIMES DAILY
Status: DISCONTINUED | OUTPATIENT
Start: 2025-04-04 | End: 2025-04-04 | Stop reason: HOSPADM

## 2025-04-04 RX ADMIN — DIAZEPAM 2 MG: 2 TABLET ORAL at 10:18

## 2025-04-04 RX ADMIN — PANTOPRAZOLE SODIUM 40 MG: 40 INJECTION, POWDER, FOR SOLUTION INTRAVENOUS at 10:01

## 2025-04-04 RX ADMIN — CITALOPRAM HYDROBROMIDE 40 MG: 20 TABLET ORAL at 09:59

## 2025-04-04 RX ADMIN — Medication 2000 UNITS: at 09:59

## 2025-04-04 RX ADMIN — ISOSORBIDE MONONITRATE 30 MG: 30 TABLET, EXTENDED RELEASE ORAL at 10:00

## 2025-04-04 RX ADMIN — METFORMIN HYDROCHLORIDE 1000 MG: 500 TABLET ORAL at 09:59

## 2025-04-04 RX ADMIN — LEVOTHYROXINE SODIUM 100 MCG: 0.05 TABLET ORAL at 09:59

## 2025-04-04 RX ADMIN — CETIRIZINE HYDROCHLORIDE 10 MG: 10 TABLET, FILM COATED ORAL at 10:00

## 2025-04-04 RX ADMIN — CARVEDILOL 12.5 MG: 12.5 TABLET, FILM COATED ORAL at 10:02

## 2025-04-04 RX ADMIN — FAMOTIDINE 20 MG: 20 TABLET, FILM COATED ORAL at 10:00

## 2025-04-04 RX ADMIN — MECLIZINE 25 MG: 12.5 TABLET ORAL at 09:58

## 2025-04-04 RX ADMIN — CYANOCOBALAMIN TAB 500 MCG 1000 MCG: 500 TAB at 09:58

## 2025-04-04 RX ADMIN — SODIUM CHLORIDE: 0.9 INJECTION, SOLUTION INTRAVENOUS at 06:33

## 2025-04-04 RX ADMIN — OXYCODONE HYDROCHLORIDE AND ACETAMINOPHEN 1 TABLET: 5; 325 TABLET ORAL at 12:30

## 2025-04-04 RX ADMIN — BUPROPION HYDROCHLORIDE 300 MG: 150 TABLET, EXTENDED RELEASE ORAL at 10:00

## 2025-04-04 RX ADMIN — OXYCODONE HYDROCHLORIDE 2.5 MG: 5 TABLET ORAL at 12:30

## 2025-04-04 ASSESSMENT — PAIN SCALES - GENERAL
PAINLEVEL_OUTOF10: 0
PAINLEVEL_OUTOF10: 0

## 2025-04-04 NOTE — H&P
area  Extremities:  peripheral pulses palpable, no pedal edema or calf pain with palpation  Psych: normal affect     Investigations:      Laboratory Testing:  Recent Results (from the past 24 hours)   EKG 12 Lead    Collection Time: 04/03/25 10:27 AM   Result Value Ref Range    Ventricular Rate 73 BPM    Atrial Rate 73 BPM    P-R Interval 140 ms    QRS Duration 92 ms    Q-T Interval 446 ms    QTc Calculation (Bazett) 491 ms    P Axis 49 degrees    R Axis 6 degrees    T Axis 36 degrees   CBC with Auto Differential    Collection Time: 04/03/25 10:30 AM   Result Value Ref Range    WBC 6.9 3.5 - 11.0 k/uL    RBC 3.63 (L) 4.0 - 5.2 m/uL    Hemoglobin 10.9 (L) 12.0 - 16.0 g/dL    Hematocrit 32.6 (L) 36 - 46 %    MCV 89.8 80 - 100 fL    MCH 30.1 26 - 34 pg    MCHC 33.5 31 - 37 g/dL    RDW 13.5 12.5 - 15.4 %    Platelets 165 140 - 450 k/uL    MPV 7.4 6.0 - 12.0 fL    Neutrophils % 77 (H) 36 - 66 %    Lymphocytes % 13 (L) 24 - 44 %    Monocytes % 7 2 - 11 %    Eosinophils % 3 1 - 4 %    Basophils % 0 0 - 2 %    Neutrophils Absolute 5.30 1.8 - 7.7 k/uL    Lymphocytes Absolute 0.90 (L) 1.0 - 4.8 k/uL    Monocytes Absolute 0.50 0.1 - 1.2 k/uL    Eosinophils Absolute 0.20 0.0 - 0.4 k/uL    Basophils Absolute 0.00 0.0 - 0.2 k/uL   CMP    Collection Time: 04/03/25 10:30 AM   Result Value Ref Range    Sodium 139 135 - 144 mmol/L    Potassium 4.2 3.7 - 5.3 mmol/L    Chloride 101 98 - 107 mmol/L    CO2 22 20 - 31 mmol/L    Anion Gap 16 9 - 17 mmol/L    Glucose 155 (H) 70 - 99 mg/dL    BUN 15 8 - 23 mg/dL    Creatinine 1.1 (H) 0.5 - 0.9 mg/dL    Est, Glom Filt Rate 50 (L) >60 mL/min/1.73m2    Calcium 9.1 8.6 - 10.4 mg/dL    Total Protein 6.6 6.4 - 8.3 g/dL    Albumin 3.9 3.5 - 5.2 g/dL    Albumin/Globulin Ratio 1.4 1.0 - 2.5    Total Bilirubin 0.3 0.3 - 1.2 mg/dL    Alkaline Phosphatase 72 35 - 104 U/L    ALT 12 5 - 33 U/L    AST 16 <32 U/L   Troponin    Collection Time: 04/03/25 10:30 AM   Result Value Ref Range    Troponin, High  right frontal periventricular white matter. Mild parenchymal volume loss. Mild chronic microvascular disease.     CT Head W/O Contrast  Result Date: 4/3/2025  No acute intracranial abnormality.       Assessment :      Hospital Problems           Last Modified POA    * (Principal) Vertigo 4/3/2025 Yes       Plan:     Vertigo- BPPV vs vestibular neuritis- diazepam scheduled for next week- PT eval prior to discharge to make sure safe to go home- hopefully discharge this afternoon with plan for outpatient vestibular rehab  HTN/HLD- BP elevated but improved since admission /80s this AM-   IDDM-2 -   Peripheral neuropathy    Patient is admitted as observation status. Expected length of stay < 48 hours. Patient is admitted in the Med/Surge    Medical Decision Making: Robbie Campbell MD  4/4/2025  8:45 AM

## 2025-04-04 NOTE — DISCHARGE SUMMARY
[facility] Medicine  IN-PATIENT SERVICE   [facility]    Discharge Summary     Patient ID: Rachelle East  :  1940   MRN: [id number]     ACCOUNT:  [id number]   Patient's PCP: Kunal Campbell MD  Admit Date: 4/3/2025   Discharge Date: 2025  Length of Stay: 0  Code Status:  Full Code  Admitting Physician: Noah Sue MD  Discharge Physician: Kunal Campbell MD     Active Discharge Diagnoses:     Hospital Problem Lists:  Principal Problem:    Vertigo  Active Problems:    Hyperlipidemia    Hypertension    Neuropathy    T2DM (type 2 diabetes mellitus) ([facility])  Resolved Problems:    * No resolved hospital problems. *      Admission Condition:  poor     Discharged Condition: fair    Hospital Stay:     Hospital Course:  Rachelle East is a 84 y.o. female who was admitted for the management of Vertigo , presented to ER with Dizziness (Started this morning, n/v. 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  She was evaluated via neurology and was felt her symptoms were peripheral and likely related to BPPV.  She had some improvement with meclizine/lorazepam without resolution and was started on low dose diazepam on discharge.  She will plan f/u outpatient with vestibular rehab.    Significant therapeutic interventions:     Significant Diagnostic Studies:   Labs:  Hematology:  Recent Labs     25  1030 25  0629   WBC 6.9 5.1   RBC 3.63* 3.84*   HGB 10.9* 11.8*   HCT 32.6* 34.6*   MCV 89.8 90.1   MCH 30.1 30.6   MCHC 33.5 34.0   RDW 13.5 13.6    171   MPV 7.4 7.1     Chemistry:  Recent Labs     25  1030 25  1335 25  0629     --  143   K 4.2  --  3.9     --  104   CO2 22  --  27   GLUCOSE 155*  --  91   BUN 15  --  11   CREATININE 1.1*  --  1.2*   ANIONGAP 16  --  12   LABGLOM 50*  --  45*   CALCIUM 9.1  --  9.1   TROPHS 23* 26*  --      Recent Labs     25  1030 25  1844

## 2025-04-04 NOTE — CONSULTS
[facility] [place]  [address], [address]  [place] [address]  Ph: [phone] or [phone]  FAX: [phone]      Reason for consult: Vertigo  I had the pleasure of seeing your patient in neurology consultation for evaluation of acute-onset vertiginous symptoms. As you would recall, Ms. Rachelle East is an 84-year-old female with a complex medical history who presented to the emergency department on April 3, 2025, with abrupt and severe dizziness accompanied by nausea and vomiting. She described awakening in the morning and, upon attempting to rise from bed, experiencing a sudden onset of room-spinning vertigo so intense that she was unable to maintain balance. She also reported associated nausea and vomiting for which EMS administered ondansetron 4 mg en route to the hospital. She denied any concurrent headache, diplopia, tinnitus, hearing loss, focal weakness, or prior similar episodes of this severity, though she endorsed experiencing milder vertiginous episodes in the past during otologic infections.  On arrival to the ED, her vital signs were notable for hypertension (initial /81 mmHg, later trending between 157/91 and 172/91 mmHg), mild hypoxia (SpO2 between 93-95% on room air), and a QTc prolongation to 491 ms on ECG. Neurologically, she was noted to be alert and oriented to person, place, and time, but there was documentation of horizontal nystagmus bilaterally. Her initial labs were remarkable for mild anemia (Hgb 10.9 g/dL), reduced eGFR (50 mL/min/1.73m²), elevated glucose (155 mg/dL), and slightly elevated high-sensitivity troponins (23 and 26 ng/L), though serial ECGs and CT head revealed no acute cardiac or cerebrovascular pathology. MRI of the brain revealed no acute infarct or mass but showed chronic microvascular ischemic changes, old lacunar infarcts in the left cerebellum and right frontal periventricular white matter, and possible prominent perivascular  medications:  Medication Status   Bupropion  mg Continued   Carvedilol 6.25 mg BID Continued   Citalopram 40 mg Continued   Isosorbide mononitrate 30 mg Continued   Levothyroxine 100 mcg Continued   Metformin 1000 mg BID Continued   Lorazepam 1 mg PRN Continued   Meclizine 25 mg Started   Ondansetron 4 mg Administered PRN   Enoxaparin 40 mg Initiated for VTE prophylaxis   Oxycodone-acetaminophen PRN; 7.5 mg dose discontinued, replaced with 5/325 mg     Assessment and Recommendations:  Vertigo, unspecified (ICD-10: R42)  Possible benign paroxysmal positional vertigo (BPPV). The horizontal nystagmus and lack of central signs also favor a peripheral etiology.   The chronic cerebellar infarct in the left cerebellum could contribute to baseline imbalance, but the acute presentation suggests a superimposed vestibular process.   I recommend continuing symptomatic treatment with vestibular suppressants such as meclizine 25 mg TID   Outpatient vestibular rehab      Electronically Signed:  ANANT YOUNGER MD  Diplomate, American Board of Psychiatry and Neurology  Diplomate, American Board of Clinical Neurophysiology  Diplomate, American Board of Epilepsy

## 2025-04-04 NOTE — PLAN OF CARE
Problem: Chronic Conditions and Co-morbidities  Goal: Patient's chronic conditions and co-morbidity symptoms are monitored and maintained or improved  Outcome: Progressing  Flowsheets (Taken 4/3/2025 1813 by Danelle Scott RN)  Care Plan - Patient's Chronic Conditions and Co-Morbidity Symptoms are Monitored and Maintained or Improved:   Monitor and assess patient's chronic conditions and comorbid symptoms for stability, deterioration, or improvement   Collaborate with multidisciplinary team to address chronic and comorbid conditions and prevent exacerbation or deterioration   Update acute care plan with appropriate goals if chronic or comorbid symptoms are exacerbated and prevent overall improvement and discharge     Problem: Discharge Planning  Goal: Discharge to home or other facility with appropriate resources  Outcome: Progressing  Flowsheets  Taken 4/4/2025 0010 by Kirsty Mahajan RN  Discharge to home or other facility with appropriate resources: Identify barriers to discharge with patient and caregiver  Taken 4/3/2025 1813 by Danelle Scott RN  Discharge to home or other facility with appropriate resources:   Identify barriers to discharge with patient and caregiver   Arrange for needed discharge resources and transportation as appropriate   Identify discharge learning needs (meds, wound care, etc)   Refer to discharge planning if patient needs post-hospital services based on physician order or complex needs related to functional status, cognitive ability or social support system     Problem: Safety - Adult  Goal: Free from fall injury  Outcome: Progressing  Flowsheets (Taken 4/4/2025 0010)  Free From Fall Injury: Instruct family/caregiver on patient safety     Problem: Pain  Goal: Verbalizes/displays adequate comfort level or baseline comfort level  Outcome: Progressing  Flowsheets (Taken 4/4/2025 0010)  Verbalizes/displays adequate comfort level or baseline comfort level:   Encourage patient to

## 2025-04-04 NOTE — PROGRESS NOTES
Physical Therapy  Facility/Department: [address]   Physical Therapy Initial Evaluation    Patient Name: Rachelle East        MRN: [id number]    : 1940    Date of Service: 2025    Chief Complaint   Patient presents with    Dizziness     Started this morning, n/v. Ems gave 4mg of zofran.     Past Medical History:  has a past medical history of Closed fracture of right radius, Colon polyps, Gout, History of eye surgery, Hypertension, Idiopathic angioedema, Neuropathy, ELIZABETH (obstructive sleep apnea), Osteoarthritis, Thyroid disease, Thyroid mass, and Type 2 diabetes mellitus without complication.  Past Surgical History:  has a past surgical history that includes Tonsillectomy; Breast biopsy; eye surgery; laminectomy; Toe Surgery; Knee Arthroplasty (Left); joint replacement; Wrist fracture surgery (Right, 2023); and Forearm surgery (Right, 2023).    Discharge Recommendations  Discharge Recommendations: Therapy recommended at discharge  PT Equipment Recommendations  Equipment Needed: No  Other: has 4WW: PT recommends the patient use 4WW at all times and have assistance at home for 24 hours initially after discharge    Assessment  Body Structures, Functions, Activity Limitations Requiring Skilled Therapeutic Intervention: Decreased balance, Decreased safe awareness, Decreased functional mobility   Assessment: The patient was admitted due to verigo. At baseline, the patient lives with grandson and requires no assistance with ADLs and functional mobility. The patient resides in 2 story house, but lives on the main floor with 4 steps to enter with bilateral railings. The patient ambulates with 4WW and cane most of the time at baseline. Occasionally within her house, she does not use an AD. The patient reports that she falls ~1-2 times/year at baseline and it is usually posteriorly.  During session, the patient was able to perform bed mobility with supervision, transfers with CGA (with 1 loss of balance  rehabilitation services?: Yes  Additional Pertinent Hx: anxiety, cerebellar stroke, peripheral neuropathy  Response To Previous Treatment: Not applicable  Family/Caregiver Present: Yes (daughter)  Diagnosis: vertigo  Subjective  Subjective: The patient denies pain or dizziness and is agreeable to therapy. She hopes to return home with help from family.  Pain  Pre-Pain: 0  Post-Pain: 0    Home Setup/Prior Level of Function  Social/Functional History  Lives With: Family (adult grandson who is able to assist the patient, as he doesn't currently work)  Type of Home: House  Home Layout:  (the patient lives on the main level of the house and does not go upstairs)  Home Access: Stairs to enter with rails  Entrance Stairs - Number of Steps: 4  Entrance Stairs - Rails: Both  Bathroom Shower/Tub: Tub/Shower unit  Bathroom Toilet: Handicap height  Bathroom Equipment: Grab bars in shower, Shower chair  Home Equipment: Walker - 4-Wheeled, Rollator, Cane  Has the patient had two or more falls in the past year or any fall with injury in the past year?:  (however reports 1-2 falls per year at baseline, typically posteriorly)  Receives Help From: Family  Prior Level of Assist for ADLs: Independent  Prior Level of Assist for Homemaking: Independent (however, the patient's daughter and grandson are able to assist as needed)  Homemaking Responsibilities: Yes  Meal Prep Responsibility: Primary  Laundry Responsibility: Primary  Cleaning Responsibility: Primary  Bill Paying/Finance Responsibility: Primary  Shopping Responsibility: Primary  Health Care Management: Primary  Prior Level of Assist for Ambulation: Independent community ambulator, with or without device (uses 4WW or cane)  Prior Level of Assist for Transfers: Independent  Active :  (daughter also able to drive patient to appointments and plans to drive the patient when she is discharged until the patient is feeling better)  Occupation: Retired  Type of Occupation: medical

## 2025-04-04 NOTE — PROGRESS NOTES
Occupational Therapy  Occupational Therapy Initial Evaluation  Facility/Department: [address]   Patient Name: Rachelle East        MRN: [id number]    : 1940    Date of Service: 2025    Chief Complaint   Patient presents with    Dizziness     Started this morning, n/v. Ems gave 4mg of zofran.     Past Medical History:  has a past medical history of Closed fracture of right radius, Colon polyps, Gout, History of eye surgery, Hypertension, Idiopathic angioedema, Neuropathy, ELIZABETH (obstructive sleep apnea), Osteoarthritis, Thyroid disease, Thyroid mass, and Type 2 diabetes mellitus without complication.  Past Surgical History:  has a past surgical history that includes Tonsillectomy; Breast biopsy; eye surgery; laminectomy; Toe Surgery; Knee Arthroplasty (Left); joint replacement; Wrist fracture surgery (Right, 2023); and Forearm surgery (Right, 2023).    Discharge Recommendations  Discharge Recommendations: Patient would benefit from continued therapy after discharge    Assessment  Performance deficits / Impairments: Decreased functional mobility ;Decreased ADL status;Decreased safe awareness;Decreased cognition;Decreased endurance;Decreased balance;Decreased high-level IADLs;Decreased fine motor control  Assessment: Pt presents with above noted deficits impacting pt's ADL status. Pt reports at baseline, performing ADLs and functional transfers/functional mobility with mod IND using AE/DME and rollator however at this time, pt requiring SBA-CGA for engagement in functional tasks. Pt educated on safety awareness and activity pacing as well as techniques to manage dizziness throughout positional changes/activity with good return. Pt to benefit from continued therapy services while hospitalized and at discharge to maximize pt's safety and independence in performing functional tasks. Pt reports grandson is available to provide 24hr supervision/assistance and dtr can assist PRN.   Prognosis:

## 2025-04-04 NOTE — CARE COORDINATION
Case Management Assessment  Initial Evaluation    Date/Time of Evaluation: 4/4/2025 9:29 AM  Assessment Completed by: Tarah Calixto    If patient is discharged prior to next notation, then this note serves as note for discharge by case management.    Patient Name: Rachelle East                   YOB: 1940  Diagnosis: Dizziness [R42]  Vertigo [R42]                   Date / Time: 4/3/2025 10:16 AM    Patient Admission Status: Observation   Readmission Risk (Low < 19, Mod (19-27), High > 27): No data recorded  Current PCP: Kunal Campbell MD  PCP verified by CM? Yes    Chart Reviewed: Yes      History Provided by: Patient  Patient Orientation: Alert and Oriented    Patient Cognition: Alert    Hospitalization in the last 30 days (Readmission):  No    If yes, Readmission Assessment in  Navigator will be completed.    Advance Directives:      Code Status: Full Code   Patient's Primary Decision Maker is: Legal Next of Kin (daughter, Srinivas)    Primary Decision Maker: Srinivas Petty - Child - [phone]    Discharge Planning:    Patient lives with: Alone Type of Home: House  Primary Care Giver: Self  Patient Support Systems include: Family Members   Current Financial resources: Medicare  Current community resources: None  Current services prior to admission: Durable Medical Equipment            Current DME: Walker            Type of Home Care services:  None    ADLS  Prior functional level: Assistance with the following:, Mobility (uses a walker)  Current functional level: Mobility, Assistance with the following:    PT AM-PAC:   /24  OT AM-PAC:   /24    Family can provide assistance at DC: Yes  Would you like Case Management to discuss the discharge plan with any other family members/significant others, and if so, who? No  Plans to Return to Present Housing: Yes  Other Identified Issues/Barriers to RETURNING to current housing: mobility, await PT/OT eval  Potential Assistance needed at discharge:   Patient Representative Agree with the Discharge Plan? yes     Tarah Calixto  Case Management Department                       Post Acute Facility/Agency List     Provided child with the following list, the list includes the overall star ratings obtained from CMS per the Medicare Web site (www.Medicare.gov):     [] Long Term Acute Care Facilities  [] Acute Inpatient Rehabilitation Facilities  [] Skilled Nursing Facilities  [] Hospice Facilities  [x] Home Care    Provided verbal instructions on how to utilize the QR Code to obtain additional detailed star ratings from www.Medicare.gov     offered to print and provide the detailed list:    []Accepted   [x]Declined

## 2025-04-04 NOTE — TELEPHONE ENCOUNTER
Care Transitions Initial Follow Up Call    Outreach made within 2 business days of discharge: Yes    Patient: Rachelle East Patient : 1940   MRN: [id number]  Reason for Admission: Dizziness and vomiting   Discharge Date: 2025       Spoke with: Srinivas Nur daughter     Discharge department/facility: [facility] Interactive Patient Contact:  Was patient able to fill all prescriptions: Yes  Was patient instructed to bring all medications to the follow-up visit: Yes  Is patient taking all medications as directed in the discharge summary? Yes  Does patient understand their discharge instructions: Yes  Does patient have questions or concerns that need addressed prior to 7-14 day follow up office visit: no    Additional needs identified to be addressed with provider  No needs identified             Scheduled appointment with PCP within 7-14 days    Follow Up  Future Appointments   Date Time Provider Department Center   2025  2:15 PM Kunal Campbell MD [place] [facility]   2025 10:45 AM Kunal Campbell MD [place] [facility]       Aditi Mauricio MA

## 2025-04-04 NOTE — TELEPHONE ENCOUNTER
Rachelle East is calling to request a refill on the following medication(s):    Medication Request:  Requested Prescriptions     Pending Prescriptions Disp Refills    buPROPion (WELLBUTRIN XL) 300 MG extended release tablet [Pharmacy Med Name: buPROPion HCL  MG TABLET] 30 tablet 5     Sig: TAKE 1 TABLET BY MOUTH EVERY MORNING       Last Visit Date (If Applicable):  1/28/2025    Next Visit Date:    5/12/2025

## 2025-04-09 ENCOUNTER — OFFICE VISIT (OUTPATIENT)
Age: 85
End: 2025-04-09
Payer: MEDICARE

## 2025-04-09 VITALS
HEIGHT: 62 IN | DIASTOLIC BLOOD PRESSURE: 88 MMHG | WEIGHT: 162 LBS | BODY MASS INDEX: 29.81 KG/M2 | SYSTOLIC BLOOD PRESSURE: 124 MMHG | OXYGEN SATURATION: 99 % | HEART RATE: 88 BPM

## 2025-04-09 DIAGNOSIS — G62.9 SMALL FIBER NEUROPATHY: Primary | ICD-10-CM

## 2025-04-09 DIAGNOSIS — H81.23 VESTIBULAR NEURONITIS OF BOTH EARS: ICD-10-CM

## 2025-04-09 DIAGNOSIS — E11.69 TYPE 2 DIABETES MELLITUS WITH OTHER SPECIFIED COMPLICATION, WITHOUT LONG-TERM CURRENT USE OF INSULIN: ICD-10-CM

## 2025-04-09 DIAGNOSIS — R26.81 GAIT INSTABILITY: ICD-10-CM

## 2025-04-09 PROCEDURE — 3044F HG A1C LEVEL LT 7.0%: CPT | Performed by: FAMILY MEDICINE

## 2025-04-09 PROCEDURE — 3079F DIAST BP 80-89 MM HG: CPT | Performed by: FAMILY MEDICINE

## 2025-04-09 PROCEDURE — 1159F MED LIST DOCD IN RCRD: CPT | Performed by: FAMILY MEDICINE

## 2025-04-09 PROCEDURE — 99214 OFFICE O/P EST MOD 30 MIN: CPT | Performed by: FAMILY MEDICINE

## 2025-04-09 PROCEDURE — 3074F SYST BP LT 130 MM HG: CPT | Performed by: FAMILY MEDICINE

## 2025-04-09 PROCEDURE — 1123F ACP DISCUSS/DSCN MKR DOCD: CPT | Performed by: FAMILY MEDICINE

## 2025-04-09 NOTE — PROGRESS NOTES
[facility]  [facility]  [address]  [facility] [address]  Dept: [phone]     Date of Visit:  2025  Patient Name: Rachelle East   Patient :  1940     CHIEF COMPLAINT/HPI:     Rachelle East is a 84 y.o. female who presents today for an general visit to be evaluated for the following condition(s):  Chief Complaint   Patient presents with    Follow-Up from Hospital     D/C on   TCM call made on    Patient states her dizziness is markedly improved.  Did experience 1 episode that lasted several seconds.  Her nausea is better.  She had to stumble to the bathroom.    REVIEW OF SYSTEM      Review of Systems   Respiratory:  Negative for chest tightness and shortness of breath.    Cardiovascular:  Negative for chest pain.         REVIEWED INFORMATION      Allergies   Allergen Reactions    Black [place] Pollen Allergy Skin Test Other (See Comments)    Cephalexin Other (See Comments)     Depression      Flavoxate Hcl     Sulfa Antibiotics     Sulphamethoxydiazine Hives    Tizanidine     Penicillins Swelling and Rash       Current Outpatient Medications   Medication Sig Dispense Refill    buPROPion (WELLBUTRIN XL) 300 MG extended release tablet TAKE 1 TABLET BY MOUTH EVERY MORNING 30 tablet 5    diazePAM (VALIUM) 2 MG tablet Take 1 tablet by mouth every 8 hours for 10 days. Max Daily Amount: 6 mg 30 tablet 0    ondansetron (ZOFRAN-ODT) 4 MG disintegrating tablet Take 1 tablet by mouth every 8 hours as needed for Nausea or Vomiting 21 tablet 1    NONFORMULARY Cetirizine NCL and Prednisolone for \"spontaneous idiopathic angioedema.\"      isosorbide mononitrate (IMDUR) 30 MG extended release tablet TAKE 1 TABLET BY MOUTH EVERY MORNING 90 tablet 3    citalopram (CELEXA) 40 MG tablet TAKE 1 TABLET BY MOUTH DAILY 90 tablet 3    oxyBUTYnin (DITROPAN) 5 MG tablet TAKE 1 TABLET BY MOUTH 2 TIMES A DAY FOR BLADDER SPASM 60 tablet 5    carvedilol (COREG)

## 2025-04-11 ASSESSMENT — ENCOUNTER SYMPTOMS
CHEST TIGHTNESS: 0
SHORTNESS OF BREATH: 0

## 2025-04-14 DIAGNOSIS — F41.9 ANXIETY: ICD-10-CM

## 2025-04-14 NOTE — TELEPHONE ENCOUNTER
Rachelle East is calling to request a refill on the following medication(s):    Medication Request:  Requested Prescriptions     Pending Prescriptions Disp Refills    LORazepam (ATIVAN) 1 MG tablet [Pharmacy Med Name: LORazepam 1 MG TABLET] 120 tablet      Sig: TAKE ONE TABLET BY MOUTH EVERY 6 HOURS AS NEEDED FOR ANXIETY FOR UP TO 90 DAYS. NOT TO EXCEED 4 TABLETS A DAY       Last Visit Date (If Applicable):  4/9/2025    Next Visit Date:    5/12/2025

## 2025-04-15 ENCOUNTER — TELEPHONE (OUTPATIENT)
Age: 85
End: 2025-04-15

## 2025-04-15 DIAGNOSIS — G62.9 SMALL FIBER NEUROPATHY: Primary | ICD-10-CM

## 2025-04-15 RX ORDER — LORAZEPAM 1 MG/1
TABLET ORAL
Qty: 120 TABLET | Refills: 2 | Status: SHIPPED | OUTPATIENT
Start: 2025-04-15 | End: 2025-07-14

## 2025-04-15 RX ORDER — OXYCODONE AND ACETAMINOPHEN 7.5; 325 MG/1; MG/1
1 TABLET ORAL EVERY 4 HOURS PRN
Qty: 180 TABLET | Refills: 0 | Status: SHIPPED | OUTPATIENT
Start: 2025-04-15 | End: 2025-05-15

## 2025-04-15 RX ORDER — OXYCODONE AND ACETAMINOPHEN 7.5; 325 MG/1; MG/1
1 TABLET ORAL EVERY 4 HOURS PRN
COMMUNITY
End: 2025-04-15 | Stop reason: SDUPTHER

## 2025-04-15 NOTE — TELEPHONE ENCOUNTER
Rachelle East is calling to request a refill on the following medication(s):    Medication Request:  Requested Prescriptions     Pending Prescriptions Disp Refills    oxyCODONE-acetaminophen (PERCOCET) 7.5-325 MG per tablet 180 tablet 0     Sig: Take 1 tablet by mouth every 4 hours as needed for Pain for up to 30 days. Max Daily Amount: 6 tablets       Last Visit Date (If Applicable):  4/9/2025    Next Visit Date:    5/12/2025

## 2025-04-15 NOTE — TELEPHONE ENCOUNTER
Patient says she has 2 refills of her lorazepam and and the pharmacy said that it was cancelled was it because she was on diazepam? But she is no longer on that as she is out so can she get a refill? She said the pharmacy did fill it but she is just not sure what happen

## 2025-04-15 NOTE — TELEPHONE ENCOUNTER
Diazepam was a temporary med for dizziness/vertigo and she is finished with that med- new med was sent for lorazepam this AM- tell patient to notify us if pharmacy is not filling lorazepam and we will call them

## 2025-04-18 ENCOUNTER — TELEPHONE (OUTPATIENT)
Age: 85
End: 2025-04-18

## 2025-04-18 DIAGNOSIS — Z78.0 POST-MENOPAUSAL: Primary | ICD-10-CM

## 2025-04-18 NOTE — TELEPHONE ENCOUNTER
Patient advised of message.  Although she wanted to mention that she WASN'T ok with dexa unless dr GE was alright with it    She will go ahead and get one scheduled anyway but frustrated with the insurance company    She will schedule when [facility] calls her

## 2025-04-18 NOTE — TELEPHONE ENCOUNTER
Order for DEXA placed in chart- notify patient that [facility] should be calling her to schedule DEXA

## 2025-04-18 NOTE — TELEPHONE ENCOUNTER
Received phone call from [facility].    They sent fax regarding patient would benefit from bone density test and patient is wanting to have done if you think she should    Fax in Dr GE slot on [email or web address]    Karina with [facility] Phone# [phone]

## 2025-05-12 ENCOUNTER — OFFICE VISIT (OUTPATIENT)
Age: 85
End: 2025-05-12
Payer: MEDICARE

## 2025-05-12 VITALS
DIASTOLIC BLOOD PRESSURE: 84 MMHG | HEART RATE: 79 BPM | SYSTOLIC BLOOD PRESSURE: 120 MMHG | TEMPERATURE: 97.4 F | HEIGHT: 62 IN | OXYGEN SATURATION: 96 % | BODY MASS INDEX: 30.18 KG/M2 | WEIGHT: 164 LBS

## 2025-05-12 DIAGNOSIS — G62.9 POLYNEUROPATHY: ICD-10-CM

## 2025-05-12 DIAGNOSIS — G62.9 SMALL FIBER NEUROPATHY: ICD-10-CM

## 2025-05-12 DIAGNOSIS — E11.69 TYPE 2 DIABETES MELLITUS WITH OTHER SPECIFIED COMPLICATION, WITHOUT LONG-TERM CURRENT USE OF INSULIN (HCC): ICD-10-CM

## 2025-05-12 DIAGNOSIS — Z00.00 MEDICARE ANNUAL WELLNESS VISIT, SUBSEQUENT: Primary | ICD-10-CM

## 2025-05-12 PROCEDURE — 3044F HG A1C LEVEL LT 7.0%: CPT | Performed by: FAMILY MEDICINE

## 2025-05-12 PROCEDURE — G0439 PPPS, SUBSEQ VISIT: HCPCS | Performed by: FAMILY MEDICINE

## 2025-05-12 PROCEDURE — 3079F DIAST BP 80-89 MM HG: CPT | Performed by: FAMILY MEDICINE

## 2025-05-12 PROCEDURE — 99213 OFFICE O/P EST LOW 20 MIN: CPT | Performed by: FAMILY MEDICINE

## 2025-05-12 PROCEDURE — 3074F SYST BP LT 130 MM HG: CPT | Performed by: FAMILY MEDICINE

## 2025-05-12 PROCEDURE — 1159F MED LIST DOCD IN RCRD: CPT | Performed by: FAMILY MEDICINE

## 2025-05-12 PROCEDURE — 1123F ACP DISCUSS/DSCN MKR DOCD: CPT | Performed by: FAMILY MEDICINE

## 2025-05-12 ASSESSMENT — PATIENT HEALTH QUESTIONNAIRE - PHQ9
SUM OF ALL RESPONSES TO PHQ QUESTIONS 1-9: 0
5. POOR APPETITE OR OVEREATING: NOT AT ALL
2. FEELING DOWN, DEPRESSED OR HOPELESS: NOT AT ALL
6. FEELING BAD ABOUT YOURSELF - OR THAT YOU ARE A FAILURE OR HAVE LET YOURSELF OR YOUR FAMILY DOWN: NOT AT ALL
4. FEELING TIRED OR HAVING LITTLE ENERGY: NOT AT ALL
10. IF YOU CHECKED OFF ANY PROBLEMS, HOW DIFFICULT HAVE THESE PROBLEMS MADE IT FOR YOU TO DO YOUR WORK, TAKE CARE OF THINGS AT HOME, OR GET ALONG WITH OTHER PEOPLE: NOT DIFFICULT AT ALL
SUM OF ALL RESPONSES TO PHQ QUESTIONS 1-9: 0
8. MOVING OR SPEAKING SO SLOWLY THAT OTHER PEOPLE COULD HAVE NOTICED. OR THE OPPOSITE, BEING SO FIGETY OR RESTLESS THAT YOU HAVE BEEN MOVING AROUND A LOT MORE THAN USUAL: NOT AT ALL
9. THOUGHTS THAT YOU WOULD BE BETTER OFF DEAD, OR OF HURTING YOURSELF: NOT AT ALL
3. TROUBLE FALLING OR STAYING ASLEEP: NOT AT ALL
1. LITTLE INTEREST OR PLEASURE IN DOING THINGS: NOT AT ALL
SUM OF ALL RESPONSES TO PHQ QUESTIONS 1-9: 0
SUM OF ALL RESPONSES TO PHQ QUESTIONS 1-9: 0
7. TROUBLE CONCENTRATING ON THINGS, SUCH AS READING THE NEWSPAPER OR WATCHING TELEVISION: NOT AT ALL

## 2025-05-12 NOTE — PROGRESS NOTES
[facility]  [facility]  [address]  [facility] [address]  Dept: [phone]     Date of Visit:  2025  Patient Name: Rachelle East   Patient :  1940     CHIEF COMPLAINT/HPI:     Rachelle East is a 85 y.o. female who presents today for an general visit to be evaluated for the following condition(s):  Chief Complaint   Patient presents with    Medicare AWV     Patient is here for a check up and a medicare wellness  Care teams up to date      Patient having some increased stress with family.  Some situational anxieties.  She had another hammertoe and had surgery.  Occasional dizziness but markedly improved . Her balance is getting worse.  Pt is under some financial stress.  Patient with sharp short pain little toe and heels BILAT.  REVIEW OF SYSTEM      Review of Systems   Respiratory:  Negative for chest tightness and shortness of breath.    Cardiovascular:  Negative for chest pain.         REVIEWED INFORMATION      Allergies   Allergen Reactions    Black [place] Pollen Allergy Skin Test Other (See Comments)    Cephalexin Other (See Comments)     Depression      Flavoxate Hcl     Sulfa Antibiotics     Sulphamethoxydiazine Hives    Tizanidine     Penicillins Swelling and Rash       Current Outpatient Medications   Medication Sig Dispense Refill    LORazepam (ATIVAN) 1 MG tablet TAKE ONE TABLET BY MOUTH EVERY 6 HOURS AS NEEDED FOR ANXIETY FOR UP TO 90 DAYS. NOT TO EXCEED 4 TABLETS A  tablet 2    buPROPion (WELLBUTRIN XL) 300 MG extended release tablet TAKE 1 TABLET BY MOUTH EVERY MORNING 30 tablet 5    NONFORMULARY Cetirizine NCL and Prednisolone for \"spontaneous idiopathic angioedema.\"      isosorbide mononitrate (IMDUR) 30 MG extended release tablet TAKE 1 TABLET BY MOUTH EVERY MORNING 90 tablet 3    citalopram (CELEXA) 40 MG tablet TAKE 1 TABLET BY MOUTH DAILY 90 tablet 3    oxyBUTYnin (DITROPAN) 5 MG tablet TAKE 1 TABLET BY MOUTH 2 TIMES

## 2025-05-19 ASSESSMENT — ENCOUNTER SYMPTOMS
SHORTNESS OF BREATH: 0
CHEST TIGHTNESS: 0

## 2025-05-27 ENCOUNTER — HOSPITAL ENCOUNTER (OUTPATIENT)
Dept: MAMMOGRAPHY | Age: 85
Discharge: HOME | End: 2025-05-29
Attending: FAMILY MEDICINE
Payer: MEDICARE

## 2025-05-27 DIAGNOSIS — G62.9 SMALL FIBER NEUROPATHY: Primary | ICD-10-CM

## 2025-05-27 DIAGNOSIS — Z78.0 POST-MENOPAUSAL: ICD-10-CM

## 2025-05-27 PROCEDURE — 77080 DXA BONE DENSITY AXIAL: CPT

## 2025-05-27 RX ORDER — OXYCODONE AND ACETAMINOPHEN 7.5; 325 MG/1; MG/1
1 TABLET ORAL EVERY 4 HOURS PRN
Qty: 180 TABLET | Refills: 0 | Status: SHIPPED | OUTPATIENT
Start: 2025-05-27 | End: 2025-06-26

## 2025-05-27 NOTE — TELEPHONE ENCOUNTER
Rachelle East is calling to request a refill on the following medication(s):    Medication Request:  Requested Prescriptions     Pending Prescriptions Disp Refills    oxyCODONE-acetaminophen (PERCOCET) 7.5-325 MG per tablet 180 tablet 0     Sig: Take 1 tablet by mouth every 4 hours as needed for Pain for up to 30 days. Intended supply: 7 days Max Daily Amount: 6 tablets       Last Visit Date (If Applicable):  5/12/2025    Next Visit Date:    Visit date not found

## 2025-05-27 NOTE — TELEPHONE ENCOUNTER
Rachelle East is calling to request a refill on the following medication(s):    Medication Request:  Requested Prescriptions     Pending Prescriptions Disp Refills    metFORMIN (GLUCOPHAGE) 1000 MG tablet [Pharmacy Med Name: METFORMIN HCL 1,000 MG TABLET] 180 tablet 1     Sig: TAKE 1 TABLET BY MOUTH 2 TIMES A DAY IN THE MORNING AND AT BEDTIME       Last Visit Date (If Applicable):  5/12/2025    Next Visit Date:    Visit date not found

## 2025-06-30 RX ORDER — LEVOTHYROXINE SODIUM 100 UG/1
100 TABLET ORAL DAILY
Qty: 90 TABLET | Refills: 2 | Status: SHIPPED | OUTPATIENT
Start: 2025-06-30 | End: 2025-07-01 | Stop reason: SDUPTHER

## 2025-06-30 RX ORDER — OXYBUTYNIN CHLORIDE 5 MG/1
TABLET ORAL
Qty: 60 TABLET | Refills: 5 | Status: SHIPPED | OUTPATIENT
Start: 2025-06-30

## 2025-06-30 NOTE — TELEPHONE ENCOUNTER
Rachelle East is calling to request a refill on the following medication(s):    Medication Request:  Requested Prescriptions     Pending Prescriptions Disp Refills    oxyBUTYnin (DITROPAN) 5 MG tablet [Pharmacy Med Name: oxyBUTYnin 5 MG TABLET] 60 tablet 5     Sig: TAKE 1 TABLET BY MOUTH 2 TIMES A DAY FOR BLADDER SPASM    levothyroxine (SYNTHROID) 100 MCG tablet [Pharmacy Med Name: LEVOTHYROXINE 100 MCG TABLET] 90 tablet 2     Sig: TAKE 1 TABLET BY MOUTH DAILY       Last Visit Date (If Applicable):  5/12/2025    Next Visit Date:    8/18/2025

## 2025-07-01 DIAGNOSIS — G62.9 SMALL FIBER NEUROPATHY: ICD-10-CM

## 2025-07-01 RX ORDER — LEVOTHYROXINE SODIUM 100 UG/1
100 TABLET ORAL DAILY
Qty: 90 TABLET | Refills: 2 | Status: SHIPPED | OUTPATIENT
Start: 2025-07-01

## 2025-07-01 RX ORDER — OXYCODONE AND ACETAMINOPHEN 7.5; 325 MG/1; MG/1
1 TABLET ORAL EVERY 4 HOURS PRN
Qty: 180 TABLET | Refills: 0 | Status: SHIPPED | OUTPATIENT
Start: 2025-07-01 | End: 2025-07-31

## 2025-07-01 NOTE — TELEPHONE ENCOUNTER
Rachelle East is calling to request a refill on the following medication(s):    Medication Request:  Requested Prescriptions     Pending Prescriptions Disp Refills    oxyCODONE-acetaminophen (PERCOCET) 7.5-325 MG per tablet 180 tablet 0     Sig: Take 1 tablet by mouth every 4 hours as needed for Pain for up to 30 days. Max Daily Amount: 6 tablets    levothyroxine (SYNTHROID) 100 MCG tablet 90 tablet 2     Sig: Take 1 tablet by mouth daily       Last Visit Date (If Applicable):  5/12/2025    Next Visit Date:    8/18/2025

## 2025-07-21 ENCOUNTER — TELEPHONE (OUTPATIENT)
Age: 85
End: 2025-07-21

## 2025-07-21 DIAGNOSIS — Z12.31 ENCOUNTER FOR SCREENING MAMMOGRAM FOR MALIGNANT NEOPLASM OF BREAST: Primary | ICD-10-CM

## 2025-07-21 NOTE — TELEPHONE ENCOUNTER
Patient is requesting an order for a mammogram screening, she will do it at a [facility]. She will plan on getting a call from scheduling so no need to call her unless this is a problem. Thanks

## 2025-08-05 ENCOUNTER — HOSPITAL ENCOUNTER (OUTPATIENT)
Age: 85
Discharge: HOME | End: 2025-08-07
Attending: FAMILY MEDICINE
Payer: MEDICARE

## 2025-08-05 DIAGNOSIS — Z12.31 ENCOUNTER FOR SCREENING MAMMOGRAM FOR MALIGNANT NEOPLASM OF BREAST: ICD-10-CM

## 2025-08-05 PROCEDURE — 77067 SCR MAMMO BI INCL CAD: CPT

## 2025-08-08 DIAGNOSIS — G62.9 SMALL FIBER NEUROPATHY: ICD-10-CM

## 2025-08-08 RX ORDER — OXYCODONE AND ACETAMINOPHEN 7.5; 325 MG/1; MG/1
1 TABLET ORAL EVERY 4 HOURS PRN
Qty: 180 TABLET | Refills: 0 | Status: SHIPPED | OUTPATIENT
Start: 2025-08-08 | End: 2025-09-07

## 2025-08-18 ENCOUNTER — OFFICE VISIT (OUTPATIENT)
Age: 85
End: 2025-08-18
Payer: MEDICARE

## 2025-08-18 ENCOUNTER — HOSPITAL ENCOUNTER (OUTPATIENT)
Age: 85
Setting detail: SPECIMEN
Discharge: HOME | End: 2025-08-18

## 2025-08-18 VITALS
WEIGHT: 160 LBS | TEMPERATURE: 97 F | HEIGHT: 62 IN | SYSTOLIC BLOOD PRESSURE: 138 MMHG | DIASTOLIC BLOOD PRESSURE: 86 MMHG | HEART RATE: 93 BPM | BODY MASS INDEX: 29.44 KG/M2 | OXYGEN SATURATION: 94 %

## 2025-08-18 DIAGNOSIS — G62.9 POLYNEUROPATHY: ICD-10-CM

## 2025-08-18 DIAGNOSIS — E53.8 VITAMIN B12 DEFICIENCY: ICD-10-CM

## 2025-08-18 DIAGNOSIS — E11.69 TYPE 2 DIABETES MELLITUS WITH OTHER SPECIFIED COMPLICATION, WITHOUT LONG-TERM CURRENT USE OF INSULIN (HCC): ICD-10-CM

## 2025-08-18 DIAGNOSIS — I10 PRIMARY HYPERTENSION: ICD-10-CM

## 2025-08-18 DIAGNOSIS — E03.9 HYPOTHYROIDISM, UNSPECIFIED TYPE: ICD-10-CM

## 2025-08-18 DIAGNOSIS — R13.19 ESOPHAGEAL DYSPHAGIA: ICD-10-CM

## 2025-08-18 DIAGNOSIS — R13.19 ESOPHAGEAL DYSPHAGIA: Primary | ICD-10-CM

## 2025-08-18 LAB
ALBUMIN SERPL-MCNC: 4.2 G/DL (ref 3.5–5.2)
ALBUMIN/GLOB SERPL: 1.5 {RATIO} (ref 1–2.5)
ALP SERPL-CCNC: 67 U/L (ref 35–104)
ALT SERPL-CCNC: 15 U/L (ref 10–35)
ANION GAP SERPL CALCULATED.3IONS-SCNC: 12 MMOL/L (ref 9–16)
AST SERPL-CCNC: 16 U/L (ref 10–35)
BASOPHILS # BLD: 0.04 K/UL (ref 0–0.2)
BASOPHILS NFR BLD: 1 % (ref 0–2)
BILIRUB SERPL-MCNC: 0.2 MG/DL (ref 0–1.2)
BUN SERPL-MCNC: 21 MG/DL (ref 8–23)
CALCIUM SERPL-MCNC: 9.6 MG/DL (ref 8.6–10.4)
CHLORIDE SERPL-SCNC: 104 MMOL/L (ref 98–107)
CHOLEST SERPL-MCNC: 173 MG/DL (ref 0–199)
CHOLESTEROL/HDL RATIO: 3.1
CO2 SERPL-SCNC: 27 MMOL/L (ref 20–31)
CREAT SERPL-MCNC: 1.5 MG/DL (ref 0.6–0.9)
EOSINOPHIL # BLD: 0.57 K/UL (ref 0–0.44)
EOSINOPHILS RELATIVE PERCENT: 8 % (ref 1–4)
ERYTHROCYTE [DISTWIDTH] IN BLOOD BY AUTOMATED COUNT: 14.6 % (ref 11.8–14.4)
EST. AVERAGE GLUCOSE BLD GHB EST-MCNC: 114 MG/DL
GFR, ESTIMATED: 34 ML/MIN/1.73M2
GLUCOSE SERPL-MCNC: 102 MG/DL (ref 74–99)
HBA1C MFR BLD: 5.6 % (ref 4–6)
HCT VFR BLD AUTO: 33.8 % (ref 36.3–47.1)
HDLC SERPL-MCNC: 56 MG/DL
HGB BLD-MCNC: 10.5 G/DL (ref 11.9–15.1)
IMM GRANULOCYTES # BLD AUTO: 0.03 K/UL (ref 0–0.3)
IMM GRANULOCYTES NFR BLD: 0 %
LDLC SERPL CALC-MCNC: 95 MG/DL (ref 0–100)
LYMPHOCYTES NFR BLD: 1.41 K/UL (ref 1.1–3.7)
LYMPHOCYTES RELATIVE PERCENT: 19 % (ref 24–43)
MCH RBC QN AUTO: 28.2 PG (ref 25.2–33.5)
MCHC RBC AUTO-ENTMCNC: 31.1 G/DL (ref 28.4–34.8)
MCV RBC AUTO: 90.6 FL (ref 82.6–102.9)
MONOCYTES NFR BLD: 0.65 K/UL (ref 0.1–1.2)
MONOCYTES NFR BLD: 9 % (ref 3–12)
NEUTROPHILS NFR BLD: 63 % (ref 36–65)
NEUTS SEG NFR BLD: 4.72 K/UL (ref 1.5–8.1)
NRBC BLD-RTO: 0 PER 100 WBC
PLATELET # BLD AUTO: 212 K/UL (ref 138–453)
PMV BLD AUTO: 10.2 FL (ref 8.1–13.5)
POTASSIUM SERPL-SCNC: 5 MMOL/L (ref 3.7–5.3)
PROT SERPL-MCNC: 7 G/DL (ref 6.6–8.7)
RBC # BLD AUTO: 3.73 M/UL (ref 3.95–5.11)
RBC # BLD: ABNORMAL 10*6/UL
SODIUM SERPL-SCNC: 143 MMOL/L (ref 136–145)
TRIGL SERPL-MCNC: 108 MG/DL
TSH SERPL DL<=0.05 MIU/L-ACNC: 1.15 UIU/ML (ref 0.27–4.2)
VIT B12 SERPL-MCNC: 658 PG/ML (ref 232–1245)
VLDLC SERPL CALC-MCNC: 22 MG/DL (ref 1–30)
WBC OTHER # BLD: 7.4 K/UL (ref 3.5–11.3)

## 2025-08-18 PROCEDURE — 3075F SYST BP GE 130 - 139MM HG: CPT | Performed by: FAMILY MEDICINE

## 2025-08-18 PROCEDURE — 3044F HG A1C LEVEL LT 7.0%: CPT | Performed by: FAMILY MEDICINE

## 2025-08-18 PROCEDURE — 1123F ACP DISCUSS/DSCN MKR DOCD: CPT | Performed by: FAMILY MEDICINE

## 2025-08-18 PROCEDURE — 99214 OFFICE O/P EST MOD 30 MIN: CPT | Performed by: FAMILY MEDICINE

## 2025-08-18 PROCEDURE — 1159F MED LIST DOCD IN RCRD: CPT | Performed by: FAMILY MEDICINE

## 2025-08-18 PROCEDURE — 3079F DIAST BP 80-89 MM HG: CPT | Performed by: FAMILY MEDICINE

## 2025-08-18 RX ORDER — LORAZEPAM 1 MG/1
TABLET ORAL
COMMUNITY
Start: 2025-08-13

## 2025-08-18 SDOH — ECONOMIC STABILITY: FOOD INSECURITY: WITHIN THE PAST 12 MONTHS, YOU WORRIED THAT YOUR FOOD WOULD RUN OUT BEFORE YOU GOT MONEY TO BUY MORE.: NEVER TRUE

## 2025-08-18 SDOH — ECONOMIC STABILITY: FOOD INSECURITY: WITHIN THE PAST 12 MONTHS, THE FOOD YOU BOUGHT JUST DIDN'T LAST AND YOU DIDN'T HAVE MONEY TO GET MORE.: NEVER TRUE

## 2025-08-18 ASSESSMENT — PATIENT HEALTH QUESTIONNAIRE - PHQ9
SUM OF ALL RESPONSES TO PHQ QUESTIONS 1-9: 2
SUM OF ALL RESPONSES TO PHQ QUESTIONS 1-9: 2
3. TROUBLE FALLING OR STAYING ASLEEP: NOT AT ALL
8. MOVING OR SPEAKING SO SLOWLY THAT OTHER PEOPLE COULD HAVE NOTICED. OR THE OPPOSITE, BEING SO FIGETY OR RESTLESS THAT YOU HAVE BEEN MOVING AROUND A LOT MORE THAN USUAL: NOT AT ALL
SUM OF ALL RESPONSES TO PHQ QUESTIONS 1-9: 2
1. LITTLE INTEREST OR PLEASURE IN DOING THINGS: NOT AT ALL
6. FEELING BAD ABOUT YOURSELF - OR THAT YOU ARE A FAILURE OR HAVE LET YOURSELF OR YOUR FAMILY DOWN: NOT AT ALL
7. TROUBLE CONCENTRATING ON THINGS, SUCH AS READING THE NEWSPAPER OR WATCHING TELEVISION: NOT AT ALL
4. FEELING TIRED OR HAVING LITTLE ENERGY: NOT AT ALL
SUM OF ALL RESPONSES TO PHQ QUESTIONS 1-9: 2
5. POOR APPETITE OR OVEREATING: SEVERAL DAYS
2. FEELING DOWN, DEPRESSED OR HOPELESS: SEVERAL DAYS
10. IF YOU CHECKED OFF ANY PROBLEMS, HOW DIFFICULT HAVE THESE PROBLEMS MADE IT FOR YOU TO DO YOUR WORK, TAKE CARE OF THINGS AT HOME, OR GET ALONG WITH OTHER PEOPLE: NOT DIFFICULT AT ALL
9. THOUGHTS THAT YOU WOULD BE BETTER OFF DEAD, OR OF HURTING YOURSELF: NOT AT ALL

## 2025-08-18 ASSESSMENT — ENCOUNTER SYMPTOMS
SHORTNESS OF BREATH: 0
CHEST TIGHTNESS: 0

## 2025-09-03 ENCOUNTER — PATIENT MESSAGE (OUTPATIENT)
Age: 85
End: 2025-09-03

## (undated) DEVICE — GLOVE ORANGE PI 8   MSG9080

## (undated) DEVICE — ELECTRODE PT RET AD L9FT HI MOIST COND ADH HYDRGEL CORDED

## (undated) DEVICE — LIQUIBAND RAPID ADHESIVE 36/CS 0.8ML: Brand: MEDLINE

## (undated) DEVICE — TUBING, SUCTION, 9/32" X 20', STRAIGHT: Brand: MEDLINE INDUSTRIES, INC.

## (undated) DEVICE — SUTURE MCRYL + SZ 4-0 L18IN ABSRB UD L19MM PS-2 3/8 CIR MCP496G

## (undated) DEVICE — SUTURE VCRL SZ 3-0 L27IN ABSRB UD L19MM PS-2 3/8 CIR PRIM J427H

## (undated) DEVICE — BIT DRL L110MM DIA1.8MM QUIK CPL CALIB W/O STP REUSE

## (undated) DEVICE — Device

## (undated) DEVICE — TUBING SUCT 12FR MAL ALUM SHFT FN CAP VENT UNIV CONN W/ OBT

## (undated) DEVICE — DRAPE,U/ SHT,SPLIT,PLAS,STERIL: Brand: MEDLINE

## (undated) DEVICE — GLOVE ORANGE PI 7 1/2   MSG9075

## (undated) DEVICE — COVER,C-ARM,41X74: Brand: MEDLINE

## (undated) DEVICE — BIT DRL L100MM DIA2MM QUIK CPL W/O STP REUSE

## (undated) DEVICE — APPLICATOR MEDICATED 26 CC SOLUTION HI LT ORNG CHLORAPREP

## (undated) DEVICE — PADDING,UNDERCAST,COTTON, 4"X4YD STERILE: Brand: MEDLINE

## (undated) DEVICE — SOLUTION IRRIG 1000ML 0.9% SOD CHL USP POUR PLAS BTL

## (undated) DEVICE — SPLINT THMB W3XL12IN FBRGLS PD PRECUT LTWT DURABLE FAST SET

## (undated) DEVICE — WIPES SKIN CLOTH READYPREP 9 X 10.5 IN 2% CHLORHEX GLUCONATE CHG PREOP

## (undated) DEVICE — MHPB HAND AND FOOT PACK: Brand: MEDLINE INDUSTRIES, INC.